# Patient Record
Sex: FEMALE | Race: WHITE | NOT HISPANIC OR LATINO | Employment: UNEMPLOYED | URBAN - METROPOLITAN AREA
[De-identification: names, ages, dates, MRNs, and addresses within clinical notes are randomized per-mention and may not be internally consistent; named-entity substitution may affect disease eponyms.]

---

## 2018-05-24 LAB
ALBUMIN SERPL-MCNC: 4.4 G/DL (ref 3.6–5.1)
ALBUMIN/GLOB SERPL: 1.8 (CALC) (ref 1–2.5)
ALP SERPL-CCNC: 81 U/L (ref 33–130)
ALT SERPL-CCNC: 25 U/L (ref 6–29)
AST SERPL-CCNC: 24 U/L (ref 10–35)
BASOPHILS # BLD AUTO: 73 CELLS/UL (ref 0–200)
BASOPHILS NFR BLD AUTO: 1.1 %
BILIRUB SERPL-MCNC: 0.5 MG/DL (ref 0.2–1.2)
BUN SERPL-MCNC: 12 MG/DL (ref 7–25)
BUN/CREAT SERPL: NORMAL (CALC) (ref 6–22)
CALCIUM SERPL-MCNC: 10 MG/DL (ref 8.6–10.4)
CHLORIDE SERPL-SCNC: 103 MMOL/L (ref 98–110)
CHOLEST SERPL-MCNC: 201 MG/DL
CHOLEST/HDLC SERPL: 3 (CALC)
CO2 SERPL-SCNC: 28 MMOL/L (ref 20–31)
CREAT SERPL-MCNC: 0.82 MG/DL (ref 0.5–1.05)
EOSINOPHIL # BLD AUTO: 191 CELLS/UL (ref 15–500)
EOSINOPHIL NFR BLD AUTO: 2.9 %
ERYTHROCYTE [DISTWIDTH] IN BLOOD BY AUTOMATED COUNT: 12.6 % (ref 11–15)
FT4I SERPL CALC-MCNC: 4.7 (ref 1.4–3.8)
GLOBULIN SER CALC-MCNC: 2.4 G/DL (CALC) (ref 1.9–3.7)
GLUCOSE SERPL-MCNC: 85 MG/DL (ref 65–99)
HCT VFR BLD AUTO: 43.4 % (ref 35–45)
HDLC SERPL-MCNC: 66 MG/DL
HGB BLD-MCNC: 14.4 G/DL (ref 11.7–15.5)
LDLC SERPL CALC-MCNC: 110 MG/DL (CALC)
LYMPHOCYTES # BLD AUTO: 2231 CELLS/UL (ref 850–3900)
LYMPHOCYTES NFR BLD AUTO: 33.8 %
MCH RBC QN AUTO: 28.6 PG (ref 27–33)
MCHC RBC AUTO-ENTMCNC: 33.2 G/DL (ref 32–36)
MCV RBC AUTO: 86.3 FL (ref 80–100)
MONOCYTES # BLD AUTO: 409 CELLS/UL (ref 200–950)
MONOCYTES NFR BLD AUTO: 6.2 %
NEUTROPHILS # BLD AUTO: 3696 CELLS/UL (ref 1500–7800)
NEUTROPHILS NFR BLD AUTO: 56 %
NONHDLC SERPL-MCNC: 135 MG/DL (CALC)
PLATELET # BLD AUTO: 362 THOUSAND/UL (ref 140–400)
PMV BLD REES-ECKER: 10.5 FL (ref 7.5–12.5)
POTASSIUM SERPL-SCNC: 4.2 MMOL/L (ref 3.5–5.3)
PROT SERPL-MCNC: 6.8 G/DL (ref 6.1–8.1)
RBC # BLD AUTO: 5.03 MILLION/UL (ref 3.8–5.1)
SL AMB EGFR AFRICAN AMERICAN: 96 ML/MIN/1.73M2
SL AMB EGFR NON AFRICAN AMERICAN: 83 ML/MIN/1.73M2
SODIUM SERPL-SCNC: 140 MMOL/L (ref 135–146)
T3RU NFR SERPL: 31 % (ref 22–35)
T4 SERPL-MCNC: 15.1 MCG/DL (ref 4.5–12)
TRIGL SERPL-MCNC: 135 MG/DL
TSH SERPL-ACNC: 0.67 MIU/L
WBC # BLD AUTO: 6.6 THOUSAND/UL (ref 3.8–10.8)

## 2018-05-29 ENCOUNTER — OFFICE VISIT (OUTPATIENT)
Dept: ENDOCRINOLOGY | Facility: HOSPITAL | Age: 53
End: 2018-05-29
Payer: COMMERCIAL

## 2018-05-29 VITALS
DIASTOLIC BLOOD PRESSURE: 76 MMHG | HEART RATE: 64 BPM | SYSTOLIC BLOOD PRESSURE: 118 MMHG | WEIGHT: 219.8 LBS | BODY MASS INDEX: 37.52 KG/M2 | HEIGHT: 64 IN

## 2018-05-29 DIAGNOSIS — I10 HYPERTENSION, UNSPECIFIED TYPE: ICD-10-CM

## 2018-05-29 DIAGNOSIS — E03.9 HYPOTHYROIDISM, UNSPECIFIED TYPE: ICD-10-CM

## 2018-05-29 DIAGNOSIS — E78.5 HYPERLIPIDEMIA, UNSPECIFIED HYPERLIPIDEMIA TYPE: Primary | ICD-10-CM

## 2018-05-29 PROCEDURE — 99204 OFFICE O/P NEW MOD 45 MIN: CPT | Performed by: INTERNAL MEDICINE

## 2018-05-29 RX ORDER — LISINOPRIL AND HYDROCHLOROTHIAZIDE 12.5; 1 MG/1; MG/1
2 TABLET ORAL DAILY
COMMUNITY
Start: 2018-04-09 | End: 2018-06-06

## 2018-05-29 RX ORDER — OMEPRAZOLE 40 MG/1
80 CAPSULE, DELAYED RELEASE ORAL DAILY
Refills: 3 | COMMUNITY
Start: 2018-04-27 | End: 2019-01-09

## 2018-05-29 RX ORDER — SIMVASTATIN 40 MG
40 TABLET ORAL
COMMUNITY
Start: 2018-04-09 | End: 2019-04-09 | Stop reason: SDUPTHER

## 2018-05-29 RX ORDER — LEVOTHYROXINE SODIUM 100 MCG
TABLET ORAL
COMMUNITY
Start: 2018-04-09 | End: 2018-07-11

## 2018-05-29 NOTE — LETTER
May 29, 2018     MD Joe Perez [address]  [address]  [address] [facility]    Patient: Cholo Agarwal   YOB: 1965   Date of Visit: 5/29/2018       Dear Dr Malcom Atkinson: Thank you for referring Cholo Agarwal to me for evaluation  Below are my notes for this consultation  If you have questions, please do not hesitate to call me  I look forward to following your patient along with you  Sincerely,        Lisa Barfield DO        CC: No Recipients  Lisa Barfield DO  5/29/2018 11:23 AM  Sign at close encounter  5/29/2018    Assessment/Plan      Diagnoses and all orders for this visit:    Hyperlipidemia, unspecified hyperlipidemia type  -     Lipid Panel with Direct LDL reflex Lab Collect; Future    Hypothyroidism, unspecified type  -     TSH, 3rd generation Lab Collect; Future  -     T4, free Lab Collect; Future  -     TSH, 3rd generation Lab Collect; Future  -     T4, free Lab Collect; Future    Hypertension, unspecified type  -     Comprehensive metabolic panel Lab Collect; Future    Other orders  -     SYNTHROID 100 MCG tablet; Take one tablet Mon-Sat, take half a tablet on Sun  -     lisinopril-hydrochlorothiazide (PRINZIDE,ZESTORETIC) 10-12 5 MG per tablet; Take 2 tablets by mouth daily  -     omeprazole (PriLOSEC) 40 MG capsule; Take 80 mg by mouth daily  -     simvastatin (ZOCOR) 40 mg tablet; Take 40 mg by mouth daily at bedtime  -     Multiple Vitamins-Minerals (CENTRUM SILVER 50+WOMEN PO); Take by mouth        Assessment/Plan:  1  Hypothyroidism:  Clinically and biochemically euthyroid on Synthroid brand 100 mcg tablets, 1 tablet 6 days of the week and 0 5 tablets on Sundays  Will continue this dose  I provided a lab slip for a TSH and free T4 to be done in August which will be about 2 months after the patient's planned gastric sleeve bariatric surgery as her dose of thyroid hormone may need to go down    Will plan to see her in 6 months from now with repeat labs done just before that visit as well  2   Hyperlipidemia:  Continue simvastatin 40 mg daily  Check lipids before next visit as this dose may be able to go down as well  3   Hypertension:  Controlled on current regimen  Will monitor blood pressure closely after bariatric surgery and adjust meds as needed  CC:   Hypothyroidism    History of Present Illness     HPI: Chevy Royal is a 47y o  year old female with history of hypothyroidism, hyperlipidemia, hypertension who presents to [facility] care  Previous patient doctor Sharpe  She takes Synthroid brand 100 mcg tablets, 1 tablet 6 days of the week and half tablet on Sundays  Clinically she feels well and denies any significant symptoms of hypothyroidism or hyperthyroidism  She does have a gastric sleeve surgery planned at [facility] next month  She takes lisinopril-hydrochlorothiazide 10-12 5 mg tablets 2 tablets daily for her blood pressure and she takes simvastatin 40 mg daily for cholesterol  Review of Systems   Constitutional: Negative for chills, fatigue and fever  HENT: Negative for trouble swallowing and voice change  Eyes: Negative for visual disturbance  Respiratory: Negative for shortness of breath  Cardiovascular: Negative for chest pain, palpitations and leg swelling  Gastrointestinal: Negative for abdominal pain, nausea and vomiting  Endocrine: Negative for polydipsia and polyuria  Musculoskeletal: Negative for arthralgias and myalgias  Skin: Negative for rash  Neurological: Negative for dizziness, tremors and weakness  Hematological: Negative for adenopathy  Psychiatric/Behavioral: Negative for agitation and confusion  Historical Information   History reviewed  No pertinent past medical history  History reviewed  No pertinent surgical history    Social History   History   Alcohol use Not on file     History   Drug use: Unknown     History   Smoking Status    Never Smoker   Smokeless Tobacco    Never Used Family History:   Family History   Problem Relation Age of Onset    Arthritis Mother     Heart disease Mother     Hypertension Mother     Hyperlipidemia Mother     Arthritis Father     Heart disease Father     Hypertension Father     Hyperlipidemia Father        Meds/Allergies   Current Outpatient Prescriptions   Medication Sig Dispense Refill    lisinopril-hydrochlorothiazide (PRINZIDE,ZESTORETIC) 10-12 5 MG per tablet Take 2 tablets by mouth daily      Multiple Vitamins-Minerals (CENTRUM SILVER 50+WOMEN PO) Take by mouth      omeprazole (PriLOSEC) 40 MG capsule Take 80 mg by mouth daily  3    simvastatin (ZOCOR) 40 mg tablet Take 40 mg by mouth daily at bedtime      SYNTHROID 100 MCG tablet Take one tablet Mon-Sat, take half a tablet on Sun       No current facility-administered medications for this visit  Allergies   Allergen Reactions    Tetracyclines & Related Rash       Objective   Vitals: Blood pressure 118/76, pulse 64, height 5' 4" (1 626 m), weight 99 7 kg (219 lb 12 8 oz)  Invasive Devices          No matching active lines, drains, or airways          Physical Exam   Constitutional: She is oriented to person, place, and time  She appears well-developed and well-nourished  No distress  HENT:   Head: Normocephalic and atraumatic  Mouth/Throat: No oropharyngeal exudate  Eyes: Conjunctivae and EOM are normal  Pupils are equal, round, and reactive to light  No scleral icterus  Neck: Normal range of motion  Neck supple  No thyromegaly present  Cardiovascular: Normal rate and regular rhythm  No murmur heard  Pulmonary/Chest: Effort normal and breath sounds normal  She has no wheezes  Abdominal: Soft  Bowel sounds are normal  She exhibits no distension  There is no tenderness  Musculoskeletal: Normal range of motion  She exhibits no edema  Neurological: She is alert and oriented to person, place, and time  She exhibits normal muscle tone  Skin: Skin is warm and dry  No rash noted  She is not diaphoretic  Psychiatric: She has a normal mood and affect  Her behavior is normal        The history was obtained from the review of the chart and from the patient      Lab Results:      Recent Results (from the past 33686 hour(s))   Lipid panel    Collection Time: 05/23/18 11:27 AM   Result Value Ref Range    Total Cholesterol 201 (H) <200 mg/dL    SL AMB HDL CHOLESTEROL 66 >50 mg/dL    Triglycerides 135 <150 mg/dL    SL AMB LDL-CHOLESTEROL 110 (H) mg/dL (calc)    SL AMB CHOL/HDLC RATIO 3 0 <5 0 (calc)    SL AMB NON HDL CHOLESTEROL 135 (H) <130 mg/dL (calc)   Comprehensive metabolic panel    Collection Time: 05/23/18 11:27 AM   Result Value Ref Range    SL AMB GLUCOSE 85 65 - 99 mg/dL    BUN 12 7 - 25 mg/dL    Creatinine, Serum 0 82 0 50 - 1 05 mg/dL    eGFR Non  83 > OR = 60 mL/min/1 73m2    SL AMB EGFR  96 > OR = 60 mL/min/1 73m2    SL AMB BUN/CREATININE RATIO NOT APPLICABLE 6 - 22 (calc)    SL AMB SODIUM 140 135 - 146 mmol/L    SL AMB POTASSIUM 4 2 3 5 - 5 3 mmol/L    SL AMB CHLORIDE 103 98 - 110 mmol/L    SL AMB CARBON DIOXIDE 28 20 - 31 mmol/L    SL AMB CALCIUM 10 0 8 6 - 10 4 mg/dL    SL AMB PROTEIN, TOTAL 6 8 6 1 - 8 1 g/dL    Serum Albumin 4 4 3 6 - 5 1 g/dL    SL AMB GLOBULIN 2 4 1 9 - 3 7 g/dL (calc)    SL AMB ALBUMIN/GLOBULIN RATIO 1 8 1 0 - 2 5 (calc)    SL AMB BILIRUBIN, TOTAL 0 5 0 2 - 1 2 mg/dL    SL AMB ALKALINE PHOSPHATASE 81 33 - 130 U/L    SL AMB AST 24 10 - 35 U/L    SL AMB ALT 25 6 - 29 U/L   CBC and differential    Collection Time: 05/23/18 11:27 AM   Result Value Ref Range    SL AMB LAB WHITE BLOOD CELL COUNT 6 6 3 8 - 10 8 Thousand/uL    SL AMB LAB RED BLOOD CELLS 5 03 3 80 - 5 10 Million/uL    Hemoglobin 14 4 11 7 - 15 5 g/dL    Hematocrit 43 4 35 0 - 45 0 %    MCV 86 3 80 0 - 100 0 fL    MCH 28 6 27 0 - 33 0 pg    MCHC 33 2 32 0 - 36 0 g/dL    RDW 12 6 11 0 - 15 0 %    Platelet Count 476 835 - 400 Thousand/uL    SL AMB MPV 10 5 7 5 - 12 5 fL    Neutrophils (Absolute) 3,696 1,500 - 7,800 cells/uL    Lymphocytes (Absolute) 2,231 850 - 3,900 cells/uL    Monocytes (Absolute) 409 200 - 950 cells/uL    Eosinophils (Absolute) 191 15 - 500 cells/uL    Basophils (Absolute) 73 0 - 200 cells/uL    Neutrophils 56 %    Lymphocytes 33 8 %    Monocytes 6 2 %    Eosinophils 2 9 %    Basophils 1 1 %   Thyroid Panel With TSH    Collection Time: 05/23/18 11:27 AM   Result Value Ref Range    T3 Uptake Ratio 31 22 - 35 %    T4, Total 15 1 (H) 4 5 - 12 0 mcg/dL    Free T4 Index (T7) 4 7 (H) 1 4 - 3 8    TSH 0 67 mIU/L         No future appointments

## 2018-05-29 NOTE — PROGRESS NOTES
5/29/2018    Assessment/Plan      Diagnoses and all orders for this visit:    Hyperlipidemia, unspecified hyperlipidemia type  -     Lipid Panel with Direct LDL reflex Lab Collect; Future    Hypothyroidism, unspecified type  -     TSH, 3rd generation Lab Collect; Future  -     T4, free Lab Collect; Future  -     TSH, 3rd generation Lab Collect; Future  -     T4, free Lab Collect; Future    Hypertension, unspecified type  -     Comprehensive metabolic panel Lab Collect; Future    Other orders  -     SYNTHROID 100 MCG tablet; Take one tablet Mon-Sat, take half a tablet on Sun  -     lisinopril-hydrochlorothiazide (PRINZIDE,ZESTORETIC) 10-12 5 MG per tablet; Take 2 tablets by mouth daily  -     omeprazole (PriLOSEC) 40 MG capsule; Take 80 mg by mouth daily  -     simvastatin (ZOCOR) 40 mg tablet; Take 40 mg by mouth daily at bedtime  -     Multiple Vitamins-Minerals (CENTRUM SILVER 50+WOMEN PO); Take by mouth        Assessment/Plan:  1  Hypothyroidism:  Clinically and biochemically euthyroid on Synthroid brand 100 mcg tablets, 1 tablet 6 days of the week and 0 5 tablets on Sundays  Will continue this dose  I provided a lab slip for a TSH and free T4 to be done in August which will be about 2 months after the patient's planned gastric sleeve bariatric surgery as her dose of thyroid hormone may need to go down  Will plan to see her in 6 months from now with repeat labs done just before that visit as well  2   Hyperlipidemia:  Continue simvastatin 40 mg daily  Check lipids before next visit as this dose may be able to go down as well  3   Hypertension:  Controlled on current regimen  Will monitor blood pressure closely after bariatric surgery and adjust meds as needed  CC:   Hypothyroidism    History of Present Illness     HPI: Yasemin Bray is a 47y o  year old female with history of hypothyroidism, hyperlipidemia, hypertension who presents to establish care  Previous patient doctor Sharpe    She takes Synthroid brand 100 mcg tablets, 1 tablet 6 days of the week and half tablet on Sundays  Clinically she feels well and denies any significant symptoms of hypothyroidism or hyperthyroidism  She does have a gastric sleeve surgery planned at [facility] next month  She takes lisinopril-hydrochlorothiazide 10-12 5 mg tablets 2 tablets daily for her blood pressure and she takes simvastatin 40 mg daily for cholesterol  Review of Systems   Constitutional: Negative for chills, fatigue and fever  HENT: Negative for trouble swallowing and voice change  Eyes: Negative for visual disturbance  Respiratory: Negative for shortness of breath  Cardiovascular: Negative for chest pain, palpitations and leg swelling  Gastrointestinal: Negative for abdominal pain, nausea and vomiting  Endocrine: Negative for polydipsia and polyuria  Musculoskeletal: Negative for arthralgias and myalgias  Skin: Negative for rash  Neurological: Negative for dizziness, tremors and weakness  Hematological: Negative for adenopathy  Psychiatric/Behavioral: Negative for agitation and confusion  Historical Information   History reviewed  No pertinent past medical history  History reviewed  No pertinent surgical history    Social History   History   Alcohol use Not on file     History   Drug use: Unknown     History   Smoking Status    Never Smoker   Smokeless Tobacco    Never Used     Family History:   Family History   Problem Relation Age of Onset    Arthritis Mother     Heart disease Mother     Hypertension Mother     Hyperlipidemia Mother     Arthritis Father     Heart disease Father     Hypertension Father     Hyperlipidemia Father        Meds/Allergies   Current Outpatient Prescriptions   Medication Sig Dispense Refill    lisinopril-hydrochlorothiazide (PRINZIDE,ZESTORETIC) 10-12 5 MG per tablet Take 2 tablets by mouth daily      Multiple Vitamins-Minerals (CENTRUM SILVER 50+WOMEN PO) Take by mouth      omeprazole (PriLOSEC) 40 MG capsule Take 80 mg by mouth daily  3    simvastatin (ZOCOR) 40 mg tablet Take 40 mg by mouth daily at bedtime      SYNTHROID 100 MCG tablet Take one tablet Mon-Sat, take half a tablet on Sun       No current facility-administered medications for this visit  Allergies   Allergen Reactions    Tetracyclines & Related Rash       Objective   Vitals: Blood pressure 118/76, pulse 64, height 5' 4" (1 626 m), weight 99 7 kg (219 lb 12 8 oz)  Invasive Devices          No matching active lines, drains, or airways          Physical Exam   Constitutional: She is oriented to person, place, and time  She appears well-developed and well-nourished  No distress  HENT:   Head: Normocephalic and atraumatic  Mouth/Throat: No oropharyngeal exudate  Eyes: Conjunctivae and EOM are normal  Pupils are equal, round, and reactive to light  No scleral icterus  Neck: Normal range of motion  Neck supple  No thyromegaly present  Cardiovascular: Normal rate and regular rhythm  No murmur heard  Pulmonary/Chest: Effort normal and breath sounds normal  She has no wheezes  Abdominal: Soft  Bowel sounds are normal  She exhibits no distension  There is no tenderness  Musculoskeletal: Normal range of motion  She exhibits no edema  Neurological: She is alert and oriented to person, place, and time  She exhibits normal muscle tone  Skin: Skin is warm and dry  No rash noted  She is not diaphoretic  Psychiatric: She has a normal mood and affect  Her behavior is normal        The history was obtained from the review of the chart and from the patient      Lab Results:      Recent Results (from the past 87068 hour(s))   Lipid panel    Collection Time: 05/23/18 11:27 AM   Result Value Ref Range    Total Cholesterol 201 (H) <200 mg/dL    SL AMB HDL CHOLESTEROL 66 >50 mg/dL    Triglycerides 135 <150 mg/dL    SL AMB LDL-CHOLESTEROL 110 (H) mg/dL (calc)    SL AMB CHOL/HDLC RATIO 3 0 <5 0 (calc)    SL AMB NON HDL CHOLESTEROL 135 (H) <130 mg/dL (calc)   Comprehensive metabolic panel    Collection Time: 05/23/18 11:27 AM   Result Value Ref Range    SL AMB GLUCOSE 85 65 - 99 mg/dL    BUN 12 7 - 25 mg/dL    Creatinine, Serum 0 82 0 50 - 1 05 mg/dL    eGFR Non  83 > OR = 60 mL/min/1 73m2    SL AMB EGFR  96 > OR = 60 mL/min/1 73m2    SL AMB BUN/CREATININE RATIO NOT APPLICABLE 6 - 22 (calc)    SL AMB SODIUM 140 135 - 146 mmol/L    SL AMB POTASSIUM 4 2 3 5 - 5 3 mmol/L    SL AMB CHLORIDE 103 98 - 110 mmol/L    SL AMB CARBON DIOXIDE 28 20 - 31 mmol/L    SL AMB CALCIUM 10 0 8 6 - 10 4 mg/dL    SL AMB PROTEIN, TOTAL 6 8 6 1 - 8 1 g/dL    Serum Albumin 4 4 3 6 - 5 1 g/dL    SL AMB GLOBULIN 2 4 1 9 - 3 7 g/dL (calc)    SL AMB ALBUMIN/GLOBULIN RATIO 1 8 1 0 - 2 5 (calc)    SL AMB BILIRUBIN, TOTAL 0 5 0 2 - 1 2 mg/dL    SL AMB ALKALINE PHOSPHATASE 81 33 - 130 U/L    SL AMB AST 24 10 - 35 U/L    SL AMB ALT 25 6 - 29 U/L   CBC and differential    Collection Time: 05/23/18 11:27 AM   Result Value Ref Range    SL AMB LAB WHITE BLOOD CELL COUNT 6 6 3 8 - 10 8 Thousand/uL    SL AMB LAB RED BLOOD CELLS 5 03 3 80 - 5 10 Million/uL    Hemoglobin 14 4 11 7 - 15 5 g/dL    Hematocrit 43 4 35 0 - 45 0 %    MCV 86 3 80 0 - 100 0 fL    MCH 28 6 27 0 - 33 0 pg    MCHC 33 2 32 0 - 36 0 g/dL    RDW 12 6 11 0 - 15 0 %    Platelet Count 990 524 - 400 Thousand/uL    SL AMB MPV 10 5 7 5 - 12 5 fL    Neutrophils (Absolute) 3,696 1,500 - 7,800 cells/uL    Lymphocytes (Absolute) 2,231 850 - 3,900 cells/uL    Monocytes (Absolute) 409 200 - 950 cells/uL    Eosinophils (Absolute) 191 15 - 500 cells/uL    Basophils (Absolute) 73 0 - 200 cells/uL    Neutrophils 56 %    Lymphocytes 33 8 %    Monocytes 6 2 %    Eosinophils 2 9 %    Basophils 1 1 %   Thyroid Panel With TSH    Collection Time: 05/23/18 11:27 AM   Result Value Ref Range    T3 Uptake Ratio 31 22 - 35 %    T4, Total 15 1 (H) 4 5 - 12 0 mcg/dL    Free T4 Index (T7) 4 7 (H) 1 4 - 3 8    TSH 0 67 mIU/L         No future appointments

## 2018-06-06 ENCOUNTER — TELEPHONE (OUTPATIENT)
Dept: ENDOCRINOLOGY | Facility: HOSPITAL | Age: 53
End: 2018-06-06

## 2018-06-06 DIAGNOSIS — I10 HYPERTENSION, UNSPECIFIED TYPE: Primary | ICD-10-CM

## 2018-06-06 RX ORDER — LISINOPRIL 10 MG/1
10 TABLET ORAL DAILY
Qty: 90 TABLET | Refills: 3 | Status: SHIPPED | OUTPATIENT
Start: 2018-06-06 | End: 2018-07-11 | Stop reason: SDUPTHER

## 2018-06-06 NOTE — TELEPHONE ENCOUNTER
Pt called and said that she is having Bariatric Surgery on 6/19  She is currently on Lisinopril/Hctz  Her Surgeon does not want her on the diuretic and wants you to switch her to just Lisinopril due to dehydrations issues  If ok she wants a 90 day sent to [facility]

## 2018-06-22 ENCOUNTER — TELEPHONE (OUTPATIENT)
Dept: ENDOCRINOLOGY | Facility: HOSPITAL | Age: 53
End: 2018-06-22

## 2018-06-22 DIAGNOSIS — E03.9 HYPOTHYROIDISM, UNSPECIFIED TYPE: Primary | ICD-10-CM

## 2018-06-22 DIAGNOSIS — E78.5 HYPERLIPIDEMIA, UNSPECIFIED HYPERLIPIDEMIA TYPE: ICD-10-CM

## 2018-06-22 NOTE — TELEPHONE ENCOUNTER
Pt had bariatric surgery on 6/19/18 and was told to make 3w f/u appt with you for bp and medication check  I scheduled her with you on 7/11/18  She is questioning if she should get any labs done prior to that appt  Labs ordered at last appt 5/29/18 were for august and November  Labs can be mailed   Thanks

## 2018-07-07 LAB
ALBUMIN SERPL-MCNC: 4.2 G/DL (ref 3.6–5.1)
ALBUMIN/GLOB SERPL: 1.7 (CALC) (ref 1–2.5)
ALP SERPL-CCNC: 79 U/L (ref 33–130)
ALT SERPL-CCNC: 14 U/L (ref 6–29)
AST SERPL-CCNC: 18 U/L (ref 10–35)
BILIRUB SERPL-MCNC: 0.5 MG/DL (ref 0.2–1.2)
BUN SERPL-MCNC: 11 MG/DL (ref 7–25)
BUN/CREAT SERPL: NORMAL (CALC) (ref 6–22)
CALCIUM SERPL-MCNC: 10.1 MG/DL (ref 8.6–10.4)
CHLORIDE SERPL-SCNC: 103 MMOL/L (ref 98–110)
CHOLEST SERPL-MCNC: 166 MG/DL
CHOLEST/HDLC SERPL: 3.3 (CALC)
CO2 SERPL-SCNC: 26 MMOL/L (ref 20–31)
CREAT SERPL-MCNC: 0.78 MG/DL (ref 0.5–1.05)
GLOBULIN SER CALC-MCNC: 2.5 G/DL (CALC) (ref 1.9–3.7)
GLUCOSE SERPL-MCNC: 80 MG/DL (ref 65–99)
HDLC SERPL-MCNC: 50 MG/DL
LDLC SERPL CALC-MCNC: 99 MG/DL (CALC)
NONHDLC SERPL-MCNC: 116 MG/DL (CALC)
POTASSIUM SERPL-SCNC: 4.5 MMOL/L (ref 3.5–5.3)
PROT SERPL-MCNC: 6.7 G/DL (ref 6.1–8.1)
SL AMB EGFR AFRICAN AMERICAN: 101 ML/MIN/1.73M2
SL AMB EGFR NON AFRICAN AMERICAN: 87 ML/MIN/1.73M2
SODIUM SERPL-SCNC: 140 MMOL/L (ref 135–146)
T4 FREE SERPL-MCNC: 1.6 NG/DL (ref 0.8–1.8)
TRIGL SERPL-MCNC: 84 MG/DL
TSH SERPL-ACNC: 0.21 MIU/L

## 2018-07-11 ENCOUNTER — OFFICE VISIT (OUTPATIENT)
Dept: ENDOCRINOLOGY | Facility: HOSPITAL | Age: 53
End: 2018-07-11
Payer: COMMERCIAL

## 2018-07-11 VITALS
BODY MASS INDEX: 34.35 KG/M2 | HEIGHT: 64 IN | DIASTOLIC BLOOD PRESSURE: 72 MMHG | HEART RATE: 68 BPM | WEIGHT: 201.2 LBS | SYSTOLIC BLOOD PRESSURE: 112 MMHG

## 2018-07-11 DIAGNOSIS — E78.5 HYPERLIPIDEMIA, UNSPECIFIED HYPERLIPIDEMIA TYPE: ICD-10-CM

## 2018-07-11 DIAGNOSIS — E03.9 HYPOTHYROIDISM, UNSPECIFIED TYPE: Primary | ICD-10-CM

## 2018-07-11 DIAGNOSIS — I10 HYPERTENSION, UNSPECIFIED TYPE: ICD-10-CM

## 2018-07-11 PROCEDURE — 99214 OFFICE O/P EST MOD 30 MIN: CPT | Performed by: INTERNAL MEDICINE

## 2018-07-11 RX ORDER — LANOLIN ALCOHOL/MO/W.PET/CERES
3000 CREAM (GRAM) TOPICAL DAILY
COMMUNITY
End: 2019-01-09

## 2018-07-11 RX ORDER — URSODIOL 300 MG/1
300 CAPSULE ORAL 2 TIMES DAILY
COMMUNITY
Start: 2018-06-04 | End: 2019-01-09

## 2018-07-11 RX ORDER — LISINOPRIL 10 MG/1
TABLET ORAL
Qty: 90 TABLET | Refills: 3
Start: 2018-07-11 | End: 2019-04-09

## 2018-07-11 RX ORDER — LEVOTHYROXINE SODIUM 0.07 MG/1
TABLET ORAL
Qty: 90 TABLET | Refills: 3 | Status: SHIPPED | OUTPATIENT
Start: 2018-07-11 | End: 2019-04-09 | Stop reason: SDUPTHER

## 2018-07-11 NOTE — LETTER
July 11, 2018     MD Joe Fern[address]dez [address]  [address]  [facility]    Patient: Haim Gomez   YOB: 1965   Date of Visit: 7/11/2018       Dear Dr Dorothy Andrews: Thank you for referring Haim Gomez to me for evaluation  Below are my notes for this consultation  If you have questions, please do not hesitate to call me  I look forward to following your patient along with you  Sincerely,        Elaine Banks DO        CC: No Recipients  Elaine Banks DO  7/11/2018  9:36 AM  Sign at close encounter  7/11/2018    Assessment/Plan      Diagnoses and all orders for this visit:    Hypothyroidism, unspecified type  -     levothyroxine 75 mcg tablet; 1 tab daily  SYNTHROID BRAND NECESSARY  -     TSH, 3rd generation Lab Collect; Future  -     T4, free Lab Collect; Future  -     Comprehensive metabolic panel Lab Collect; Future    Hypertension, unspecified type  -     lisinopril (ZESTRIL) 10 mg tablet; 0 5 tab daily  Hyperlipidemia, unspecified hyperlipidemia type  -     Lipid Panel with Direct LDL reflex Lab Collect; Future    Other orders  -     ursodiol (ACTIGALL) 300 mg capsule; Take 300 mg by mouth 2 (two) times a day  -     B Complex-C (SUPER B COMPLEX PO); Take by mouth  -     cyanocobalamin (VITAMIN B-12) 1,000 mcg tablet; Take 3,000 mcg by mouth daily  -     Calcium Carb-Cholecalciferol (CALCIUM 600/VITAMIN D3 PO); Take by mouth  -     Docusate Sodium (COLACE PO); Take by mouth        Assessment/Plan:  1  Hypothyroidism:  The patient has lost about 20 lb since her gastric sleeve surgery in mid June  Her labs show her dose is too high in the setting of weight loss  Will decrease her Synthroid to 75 mcg daily of the brand specific  Plan to repeat TSH free T4 with her lab work that is scheduled in September  We will see her shortly after that      2   Hypertension:  Her blood pressure is well controlled today and I think we can cut her lisinopril in half to 5 mg daily  We will monitor her blood pressure at her follow-up appointment in September and see if we can cut it in half to 2 5 mg daily or discontinue the lisinopril and monitor off of medication  3   Hyperlipidemia:  Continue simvastatin 40 mg daily and will check lipid panel at next appointment in see if we can cut down this dose as well  CC:   Hypothyroidism, hypertension, hyperlipidemia    History of Present Illness     HPI: Sherrill Simons is a 47y o  year old female with history of hypothyroidism, hypertension, hyperlipidemia who presents for a follow-up appointment  She is postop status post gastric sleeve surgery in [place]  This was done in June and she has lost about 20 lb so far and is feeling great  She denies symptoms of hyperthyroidism such as palpitations, tachycardia, heat intolerance  She continues on Synthroid brand 100 mcg daily with only a half pill on Sundays  She also takes lisinopril 10 mg daily for her blood pressure and simvastatin 40 mg daily  We reviewed her blood work which is listed below  Review of Systems   Constitutional: Negative for chills, fatigue and fever  HENT: Negative for trouble swallowing and voice change  Eyes: Negative for visual disturbance  Respiratory: Negative for shortness of breath  Cardiovascular: Negative for chest pain, palpitations and leg swelling  Gastrointestinal: Negative for abdominal pain, nausea and vomiting  Endocrine: Negative for polydipsia and polyuria  Musculoskeletal: Negative for arthralgias and myalgias  Skin: Negative for rash  Neurological: Negative for dizziness, tremors and weakness  Hematological: Negative for adenopathy  Psychiatric/Behavioral: Negative for agitation and confusion  Historical Information   History reviewed  No pertinent past medical history  History reviewed  No pertinent surgical history    Social History   History   Alcohol use Not on file     History   Drug use: Unknown History   Smoking Status    Never Smoker   Smokeless Tobacco    Never Used     Family History:   Family History   Problem Relation Age of Onset    Arthritis Mother     Heart disease Mother     Hypertension Mother     Hyperlipidemia Mother     Arthritis Father     Heart disease Father     Hypertension Father     Hyperlipidemia Father        Meds/Allergies   Current Outpatient Prescriptions   Medication Sig Dispense Refill    B Complex-C (SUPER B COMPLEX PO) Take by mouth      Calcium Carb-Cholecalciferol (CALCIUM 600/VITAMIN D3 PO) Take by mouth      cyanocobalamin (VITAMIN B-12) 1,000 mcg tablet Take 3,000 mcg by mouth daily      Docusate Sodium (COLACE PO) Take by mouth      lisinopril (ZESTRIL) 10 mg tablet 0 5 tab daily  90 tablet 3    Multiple Vitamins-Minerals (CENTRUM SILVER 50+WOMEN PO) Take by mouth      omeprazole (PriLOSEC) 40 MG capsule Take 80 mg by mouth daily  3    simvastatin (ZOCOR) 40 mg tablet Take 40 mg by mouth daily at bedtime      ursodiol (ACTIGALL) 300 mg capsule Take 300 mg by mouth 2 (two) times a day      levothyroxine 75 mcg tablet 1 tab daily  SYNTHROID BRAND NECESSARY  90 tablet 3     No current facility-administered medications for this visit  Allergies   Allergen Reactions    Tetracyclines & Related Rash       Objective   Vitals: Blood pressure 112/72, pulse 68, height 5' 4" (1 626 m), weight 91 3 kg (201 lb 3 2 oz)  Invasive Devices          No matching active lines, drains, or airways          Physical Exam   Constitutional: She is oriented to person, place, and time  She appears well-developed and well-nourished  No distress  HENT:   Head: Normocephalic and atraumatic  Mouth/Throat: No oropharyngeal exudate  Eyes: Conjunctivae and EOM are normal  Pupils are equal, round, and reactive to light  No scleral icterus  Neck: Normal range of motion  Neck supple  No thyromegaly present  Cardiovascular: Normal rate and regular rhythm      No murmur heard   Pulmonary/Chest: Effort normal and breath sounds normal  She has no wheezes  Abdominal: Soft  Bowel sounds are normal  She exhibits no distension  There is no tenderness  Musculoskeletal: Normal range of motion  She exhibits no edema  Neurological: She is alert and oriented to person, place, and time  She exhibits normal muscle tone  Skin: Skin is warm and dry  No rash noted  She is not diaphoretic  Psychiatric: She has a normal mood and affect  Her behavior is normal        The history was obtained from the review of the chart and from the patient      Lab Results:      Recent Results (from the past 44798 hour(s))   Lipid panel    Collection Time: 05/23/18 11:27 AM   Result Value Ref Range    Total Cholesterol 201 (H) <200 mg/dL    HDL 66 >50 mg/dL    Triglycerides 135 <150 mg/dL    LDL Direct 110 (H) mg/dL (calc)    Chol HDLC Ratio 3 0 <5 0 (calc)    Non-HDL Cholesterol 135 (H) <130 mg/dL (calc)   Comprehensive metabolic panel    Collection Time: 05/23/18 11:27 AM   Result Value Ref Range    SL AMB GLUCOSE 85 65 - 99 mg/dL    BUN 12 7 - 25 mg/dL    Creatinine, Serum 0 82 0 50 - 1 05 mg/dL    eGFR Non  83 > OR = 60 mL/min/1 73m2    SL AMB EGFR  96 > OR = 60 mL/min/1 73m2    SL AMB BUN/CREATININE RATIO NOT APPLICABLE 6 - 22 (calc)    SL AMB SODIUM 140 135 - 146 mmol/L    SL AMB POTASSIUM 4 2 3 5 - 5 3 mmol/L    SL AMB CHLORIDE 103 98 - 110 mmol/L    SL AMB CARBON DIOXIDE 28 20 - 31 mmol/L    SL AMB CALCIUM 10 0 8 6 - 10 4 mg/dL    SL AMB PROTEIN, TOTAL 6 8 6 1 - 8 1 g/dL    Serum Albumin 4 4 3 6 - 5 1 g/dL    SL AMB GLOBULIN 2 4 1 9 - 3 7 g/dL (calc)    SL AMB ALBUMIN/GLOBULIN RATIO 1 8 1 0 - 2 5 (calc)    SL AMB BILIRUBIN, TOTAL 0 5 0 2 - 1 2 mg/dL    SL AMB ALKALINE PHOSPHATASE 81 33 - 130 U/L    SL AMB AST 24 10 - 35 U/L    SL AMB ALT 25 6 - 29 U/L   CBC and differential    Collection Time: 05/23/18 11:27 AM   Result Value Ref Range    SL AMB LAB WHITE BLOOD CELL COUNT 6 6 3 8 - 10 8 Thousand/uL    SL AMB LAB RED BLOOD CELLS 5 03 3 80 - 5 10 Million/uL    Hemoglobin 14 4 11 7 - 15 5 g/dL    Hematocrit 43 4 35 0 - 45 0 %    MCV 86 3 80 0 - 100 0 fL    MCH 28 6 27 0 - 33 0 pg    MCHC 33 2 32 0 - 36 0 g/dL    RDW 12 6 11 0 - 15 0 %    Platelet Count 999 350 - 400 Thousand/uL    SL AMB MPV 10 5 7 5 - 12 5 fL    Neutrophils (Absolute) 3,696 1,500 - 7,800 cells/uL    Lymphocytes (Absolute) 2,231 850 - 3,900 cells/uL    Monocytes (Absolute) 409 200 - 950 cells/uL    Eosinophils (Absolute) 191 15 - 500 cells/uL    Basophils (Absolute) 73 0 - 200 cells/uL    Neutrophils 56 %    Lymphocytes 33 8 %    Monocytes 6 2 %    Eosinophils 2 9 %    Basophils 1 1 %   Thyroid Panel With TSH    Collection Time: 05/23/18 11:27 AM   Result Value Ref Range    T3 Uptake Ratio 31 22 - 35 %    T4, Total 15 1 (H) 4 5 - 12 0 mcg/dL    Free T4 Index (T7) 4 7 (H) 1 4 - 3 8    TSH 0 67 mIU/L   Lipid Panel with Direct LDL reflex    Collection Time: 07/06/18  9:56 AM   Result Value Ref Range    Total Cholesterol 166 <200 mg/dL    HDL 50 (L) >50 mg/dL    Triglycerides 84 <150 mg/dL    LDL Direct 99 mg/dL (calc)    Chol HDLC Ratio 3 3 <5 0 (calc)    Non-HDL Cholesterol 116 <130 mg/dL (calc)   Comprehensive metabolic panel    Collection Time: 07/06/18  9:56 AM   Result Value Ref Range    SL AMB GLUCOSE 80 65 - 99 mg/dL    BUN 11 7 - 25 mg/dL    Creatinine, Serum 0 78 0 50 - 1 05 mg/dL    eGFR Non  87 > OR = 60 mL/min/1 73m2    SL AMB EGFR  101 > OR = 60 mL/min/1 73m2    SL AMB BUN/CREATININE RATIO NOT APPLICABLE 6 - 22 (calc)    SL AMB SODIUM 140 135 - 146 mmol/L    SL AMB POTASSIUM 4 5 3 5 - 5 3 mmol/L    SL AMB CHLORIDE 103 98 - 110 mmol/L    SL AMB CARBON DIOXIDE 26 20 - 31 mmol/L    SL AMB CALCIUM 10 1 8 6 - 10 4 mg/dL    SL AMB PROTEIN, TOTAL 6 7 6 1 - 8 1 g/dL    Serum Albumin 4 2 3 6 - 5 1 g/dL    SL AMB GLOBULIN 2 5 1 9 - 3 7 g/dL (calc)    SL AMB ALBUMIN/GLOBULIN RATIO 1 7 1 0 - 2 5 (calc)    SL AMB BILIRUBIN, TOTAL 0 5 0 2 - 1 2 mg/dL    SL AMB ALKALINE PHOSPHATASE 79 33 - 130 U/L    SL AMB AST 18 10 - 35 U/L    SL AMB ALT 14 6 - 29 U/L   T4, free    Collection Time: 07/06/18  9:56 AM   Result Value Ref Range    Free t4 1 6 0 8 - 1 8 ng/dL   TSH, 3rd generation    Collection Time: 07/06/18  9:56 AM   Result Value Ref Range    TSH 0 21 (L) mIU/L         No future appointments

## 2018-07-11 NOTE — PROGRESS NOTES
7/11/2018    Assessment/Plan      Diagnoses and all orders for this visit:    Hypothyroidism, unspecified type  -     levothyroxine 75 mcg tablet; 1 tab daily  SYNTHROID BRAND NECESSARY  -     TSH, 3rd generation Lab Collect; Future  -     T4, free Lab Collect; Future  -     Comprehensive metabolic panel Lab Collect; Future    Hypertension, unspecified type  -     lisinopril (ZESTRIL) 10 mg tablet; 0 5 tab daily  Hyperlipidemia, unspecified hyperlipidemia type  -     Lipid Panel with Direct LDL reflex Lab Collect; Future    Other orders  -     ursodiol (ACTIGALL) 300 mg capsule; Take 300 mg by mouth 2 (two) times a day  -     B Complex-C (SUPER B COMPLEX PO); Take by mouth  -     cyanocobalamin (VITAMIN B-12) 1,000 mcg tablet; Take 3,000 mcg by mouth daily  -     Calcium Carb-Cholecalciferol (CALCIUM 600/VITAMIN D3 PO); Take by mouth  -     Docusate Sodium (COLACE PO); Take by mouth        Assessment/Plan:  1  Hypothyroidism:  The patient has lost about 20 lb since her gastric sleeve surgery in mid June  Her labs show her dose is too high in the setting of weight loss  Will decrease her Synthroid to 75 mcg daily of the brand specific  Plan to repeat TSH free T4 with her lab work that is scheduled in September  We will see her shortly after that  2   Hypertension:  Her blood pressure is well controlled today and I think we can cut her lisinopril in half to 5 mg daily  We will monitor her blood pressure at her follow-up appointment in September and see if we can cut it in half to 2 5 mg daily or discontinue the lisinopril and monitor off of medication  3   Hyperlipidemia:  Continue simvastatin 40 mg daily and will check lipid panel at next appointment in see if we can cut down this dose as well        CC:   Hypothyroidism, hypertension, hyperlipidemia    History of Present Illness     HPI: Sophy Bajwa is a 47y o  year old female with history of hypothyroidism, hypertension, hyperlipidemia who presents for a follow-up appointment  She is postop status post gastric sleeve surgery in [place]  This was done in June and she has lost about 20 lb so far and is feeling great  She denies symptoms of hyperthyroidism such as palpitations, tachycardia, heat intolerance  She continues on Synthroid brand 100 mcg daily with only a half pill on Sundays  She also takes lisinopril 10 mg daily for her blood pressure and simvastatin 40 mg daily  We reviewed her blood work which is listed below  Review of Systems   Constitutional: Negative for chills, fatigue and fever  HENT: Negative for trouble swallowing and voice change  Eyes: Negative for visual disturbance  Respiratory: Negative for shortness of breath  Cardiovascular: Negative for chest pain, palpitations and leg swelling  Gastrointestinal: Negative for abdominal pain, nausea and vomiting  Endocrine: Negative for polydipsia and polyuria  Musculoskeletal: Negative for arthralgias and myalgias  Skin: Negative for rash  Neurological: Negative for dizziness, tremors and weakness  Hematological: Negative for adenopathy  Psychiatric/Behavioral: Negative for agitation and confusion  Historical Information   History reviewed  No pertinent past medical history  History reviewed  No pertinent surgical history    Social History   History   Alcohol use Not on file     History   Drug use: Unknown     History   Smoking Status    Never Smoker   Smokeless Tobacco    Never Used     Family History:   Family History   Problem Relation Age of Onset    Arthritis Mother     Heart disease Mother     Hypertension Mother     Hyperlipidemia Mother     Arthritis Father     Heart disease Father     Hypertension Father     Hyperlipidemia Father        Meds/Allergies   Current Outpatient Prescriptions   Medication Sig Dispense Refill    B Complex-C (SUPER B COMPLEX PO) Take by mouth      Calcium Carb-Cholecalciferol (CALCIUM 600/VITAMIN D3 PO) Take by mouth      cyanocobalamin (VITAMIN B-12) 1,000 mcg tablet Take 3,000 mcg by mouth daily      Docusate Sodium (COLACE PO) Take by mouth      lisinopril (ZESTRIL) 10 mg tablet 0 5 tab daily  90 tablet 3    Multiple Vitamins-Minerals (CENTRUM SILVER 50+WOMEN PO) Take by mouth      omeprazole (PriLOSEC) 40 MG capsule Take 80 mg by mouth daily  3    simvastatin (ZOCOR) 40 mg tablet Take 40 mg by mouth daily at bedtime      ursodiol (ACTIGALL) 300 mg capsule Take 300 mg by mouth 2 (two) times a day      levothyroxine 75 mcg tablet 1 tab daily  SYNTHROID BRAND NECESSARY  90 tablet 3     No current facility-administered medications for this visit  Allergies   Allergen Reactions    Tetracyclines & Related Rash       Objective   Vitals: Blood pressure 112/72, pulse 68, height 5' 4" (1 626 m), weight 91 3 kg (201 lb 3 2 oz)  Invasive Devices          No matching active lines, drains, or airways          Physical Exam   Constitutional: She is oriented to person, place, and time  She appears well-developed and well-nourished  No distress  HENT:   Head: Normocephalic and atraumatic  Mouth/Throat: No oropharyngeal exudate  Eyes: Conjunctivae and EOM are normal  Pupils are equal, round, and reactive to light  No scleral icterus  Neck: Normal range of motion  Neck supple  No thyromegaly present  Cardiovascular: Normal rate and regular rhythm  No murmur heard  Pulmonary/Chest: Effort normal and breath sounds normal  She has no wheezes  Abdominal: Soft  Bowel sounds are normal  She exhibits no distension  There is no tenderness  Musculoskeletal: Normal range of motion  She exhibits no edema  Neurological: She is alert and oriented to person, place, and time  She exhibits normal muscle tone  Skin: Skin is warm and dry  No rash noted  She is not diaphoretic  Psychiatric: She has a normal mood and affect   Her behavior is normal        The history was obtained from the review of the chart and from the patient      Lab Results:      Recent Results (from the past 98185 hour(s))   Lipid panel    Collection Time: 05/23/18 11:27 AM   Result Value Ref Range    Total Cholesterol 201 (H) <200 mg/dL    HDL 66 >50 mg/dL    Triglycerides 135 <150 mg/dL    LDL Direct 110 (H) mg/dL (calc)    Chol HDLC Ratio 3 0 <5 0 (calc)    Non-HDL Cholesterol 135 (H) <130 mg/dL (calc)   Comprehensive metabolic panel    Collection Time: 05/23/18 11:27 AM   Result Value Ref Range    SL AMB GLUCOSE 85 65 - 99 mg/dL    BUN 12 7 - 25 mg/dL    Creatinine, Serum 0 82 0 50 - 1 05 mg/dL    eGFR Non  83 > OR = 60 mL/min/1 73m2    SL AMB EGFR  96 > OR = 60 mL/min/1 73m2    SL AMB BUN/CREATININE RATIO NOT APPLICABLE 6 - 22 (calc)    SL AMB SODIUM 140 135 - 146 mmol/L    SL AMB POTASSIUM 4 2 3 5 - 5 3 mmol/L    SL AMB CHLORIDE 103 98 - 110 mmol/L    SL AMB CARBON DIOXIDE 28 20 - 31 mmol/L    SL AMB CALCIUM 10 0 8 6 - 10 4 mg/dL    SL AMB PROTEIN, TOTAL 6 8 6 1 - 8 1 g/dL    Serum Albumin 4 4 3 6 - 5 1 g/dL    SL AMB GLOBULIN 2 4 1 9 - 3 7 g/dL (calc)    SL AMB ALBUMIN/GLOBULIN RATIO 1 8 1 0 - 2 5 (calc)    SL AMB BILIRUBIN, TOTAL 0 5 0 2 - 1 2 mg/dL    SL AMB ALKALINE PHOSPHATASE 81 33 - 130 U/L    SL AMB AST 24 10 - 35 U/L    SL AMB ALT 25 6 - 29 U/L   CBC and differential    Collection Time: 05/23/18 11:27 AM   Result Value Ref Range    SL AMB LAB WHITE BLOOD CELL COUNT 6 6 3 8 - 10 8 Thousand/uL    SL AMB LAB RED BLOOD CELLS 5 03 3 80 - 5 10 Million/uL    Hemoglobin 14 4 11 7 - 15 5 g/dL    Hematocrit 43 4 35 0 - 45 0 %    MCV 86 3 80 0 - 100 0 fL    MCH 28 6 27 0 - 33 0 pg    MCHC 33 2 32 0 - 36 0 g/dL    RDW 12 6 11 0 - 15 0 %    Platelet Count 800 293 - 400 Thousand/uL    SL AMB MPV 10 5 7 5 - 12 5 fL    Neutrophils (Absolute) 3,696 1,500 - 7,800 cells/uL    Lymphocytes (Absolute) 2,231 850 - 3,900 cells/uL    Monocytes (Absolute) 409 200 - 950 cells/uL    Eosinophils (Absolute) 191 15 - 500 cells/uL Basophils (Absolute) 73 0 - 200 cells/uL    Neutrophils 56 %    Lymphocytes 33 8 %    Monocytes 6 2 %    Eosinophils 2 9 %    Basophils 1 1 %   Thyroid Panel With TSH    Collection Time: 05/23/18 11:27 AM   Result Value Ref Range    T3 Uptake Ratio 31 22 - 35 %    T4, Total 15 1 (H) 4 5 - 12 0 mcg/dL    Free T4 Index (T7) 4 7 (H) 1 4 - 3 8    TSH 0 67 mIU/L   Lipid Panel with Direct LDL reflex    Collection Time: 07/06/18  9:56 AM   Result Value Ref Range    Total Cholesterol 166 <200 mg/dL    HDL 50 (L) >50 mg/dL    Triglycerides 84 <150 mg/dL    LDL Direct 99 mg/dL (calc)    Chol HDLC Ratio 3 3 <5 0 (calc)    Non-HDL Cholesterol 116 <130 mg/dL (calc)   Comprehensive metabolic panel    Collection Time: 07/06/18  9:56 AM   Result Value Ref Range    SL AMB GLUCOSE 80 65 - 99 mg/dL    BUN 11 7 - 25 mg/dL    Creatinine, Serum 0 78 0 50 - 1 05 mg/dL    eGFR Non  87 > OR = 60 mL/min/1 73m2    SL AMB EGFR  101 > OR = 60 mL/min/1 73m2    SL AMB BUN/CREATININE RATIO NOT APPLICABLE 6 - 22 (calc)    SL AMB SODIUM 140 135 - 146 mmol/L    SL AMB POTASSIUM 4 5 3 5 - 5 3 mmol/L    SL AMB CHLORIDE 103 98 - 110 mmol/L    SL AMB CARBON DIOXIDE 26 20 - 31 mmol/L    SL AMB CALCIUM 10 1 8 6 - 10 4 mg/dL    SL AMB PROTEIN, TOTAL 6 7 6 1 - 8 1 g/dL    Serum Albumin 4 2 3 6 - 5 1 g/dL    SL AMB GLOBULIN 2 5 1 9 - 3 7 g/dL (calc)    SL AMB ALBUMIN/GLOBULIN RATIO 1 7 1 0 - 2 5 (calc)    SL AMB BILIRUBIN, TOTAL 0 5 0 2 - 1 2 mg/dL    SL AMB ALKALINE PHOSPHATASE 79 33 - 130 U/L    SL AMB AST 18 10 - 35 U/L    SL AMB ALT 14 6 - 29 U/L   T4, free    Collection Time: 07/06/18  9:56 AM   Result Value Ref Range    Free t4 1 6 0 8 - 1 8 ng/dL   TSH, 3rd generation    Collection Time: 07/06/18  9:56 AM   Result Value Ref Range    TSH 0 21 (L) mIU/L         No future appointments

## 2018-09-11 LAB
ALBUMIN SERPL-MCNC: 4.6 G/DL (ref 3.6–5.1)
ALBUMIN/GLOB SERPL: 1.8 (CALC) (ref 1–2.5)
ALP SERPL-CCNC: 89 U/L (ref 33–130)
ALT SERPL-CCNC: 10 U/L (ref 6–29)
AST SERPL-CCNC: 14 U/L (ref 10–35)
BILIRUB SERPL-MCNC: 0.6 MG/DL (ref 0.2–1.2)
BUN SERPL-MCNC: 15 MG/DL (ref 7–25)
BUN/CREAT SERPL: NORMAL (CALC) (ref 6–22)
CALCIUM SERPL-MCNC: 10 MG/DL (ref 8.6–10.4)
CHLORIDE SERPL-SCNC: 102 MMOL/L (ref 98–110)
CHOLEST SERPL-MCNC: 178 MG/DL
CHOLEST/HDLC SERPL: 2.5 (CALC)
CO2 SERPL-SCNC: 28 MMOL/L (ref 20–32)
CREAT SERPL-MCNC: 0.73 MG/DL (ref 0.5–1.05)
GLOBULIN SER CALC-MCNC: 2.5 G/DL (CALC) (ref 1.9–3.7)
GLUCOSE SERPL-MCNC: 85 MG/DL (ref 65–99)
HDLC SERPL-MCNC: 71 MG/DL
LDLC SERPL CALC-MCNC: 88 MG/DL (CALC)
NONHDLC SERPL-MCNC: 107 MG/DL (CALC)
POTASSIUM SERPL-SCNC: 4.2 MMOL/L (ref 3.5–5.3)
PROT SERPL-MCNC: 7.1 G/DL (ref 6.1–8.1)
SL AMB EGFR AFRICAN AMERICAN: 110 ML/MIN/1.73M2
SL AMB EGFR NON AFRICAN AMERICAN: 95 ML/MIN/1.73M2
SODIUM SERPL-SCNC: 140 MMOL/L (ref 135–146)
T4 FREE SERPL-MCNC: 1.5 NG/DL (ref 0.8–1.8)
TRIGL SERPL-MCNC: 92 MG/DL
TSH SERPL-ACNC: 0.83 MIU/L

## 2018-09-20 ENCOUNTER — OFFICE VISIT (OUTPATIENT)
Dept: ENDOCRINOLOGY | Facility: HOSPITAL | Age: 53
End: 2018-09-20
Payer: COMMERCIAL

## 2018-09-20 VITALS
HEIGHT: 64 IN | BODY MASS INDEX: 31.14 KG/M2 | SYSTOLIC BLOOD PRESSURE: 134 MMHG | DIASTOLIC BLOOD PRESSURE: 90 MMHG | WEIGHT: 182.4 LBS | HEART RATE: 66 BPM

## 2018-09-20 DIAGNOSIS — I10 HYPERTENSION, UNSPECIFIED TYPE: ICD-10-CM

## 2018-09-20 DIAGNOSIS — E03.9 HYPOTHYROIDISM, UNSPECIFIED TYPE: Primary | ICD-10-CM

## 2018-09-20 DIAGNOSIS — E78.5 HYPERLIPIDEMIA, UNSPECIFIED HYPERLIPIDEMIA TYPE: ICD-10-CM

## 2018-09-20 PROCEDURE — 99214 OFFICE O/P EST MOD 30 MIN: CPT | Performed by: NURSE PRACTITIONER

## 2018-09-20 NOTE — PATIENT INSTRUCTIONS
For now, continue Synthroid at 75 mcg dose Monday through Saturday and take 1/2 tablet on Sunday  Continue lisinopril and simvastatin  Will recheck TSH and Free T4 in 6 weeks to re-evaluate

## 2018-09-20 NOTE — PROGRESS NOTES
Prema Mazariegos 47 y o  female MRN: [id number]    Encounter: [id number]      Assessment/Plan     Assessment: This is a 47y o -year-old female with hypothyroidism, hypertension and hyperlipidemia  Plan:  1  Hypothyroidism:  The patient has lost about 40 lb since her gastric sleeve surgery in mid June  She does complain of some heat intolerance and difficulty sleeping  Her most recent TSH is low-normal with a high normal free T4  I have asked her to take her Synthroid 75 mcg daily Monday through Saturday and decrease her dose to 1/2 tablet on Sunday  Recheck TSH and free T4 in 6 weeks to reassess  Further titration of her Synthroid dose will take place after review of updated lab work, if necessary      2  Hypertension:  Her blood pressure was initially elevated upon arrival in the office but reassessed to 122/76  For now, continue lisinopril at current dose      3  Hyperlipidemia:  Fasting lipid profile is stable  Continue simvastatin at current dose  CC:   Hypothyroidism follow-up    History of Present Illness     HPI:  47y o  year old female with history of hypothyroidism, hypertension, hyperlipidemia who presents for a follow-up appointment  She is postop status post gastric sleeve surgery in [place]  This was done in June and she has lost about 40 lb so far and is feeling great  She is currently taking Synthroid 75 mcg daily  Her most recent TSH from September 11, 2018 is 0 83 with a free T4 of 1 5  She complains of some insomnia worsening over the past few months and heat intolerance  For her hypertension, she is treated with lisinopril 5 mg daily  Her hyperlipidemia is treated with simvastatin 40 mg daily  Review of Systems   Constitutional: Negative  Negative for chills, fatigue and fever  HENT: Negative  Negative for trouble swallowing and voice change  Eyes: Negative  Respiratory: Negative  Negative for chest tightness and shortness of breath      Cardiovascular: Negative  Negative for chest pain  Gastrointestinal: Negative  Negative for abdominal pain, constipation, diarrhea and vomiting  Endocrine: Positive for heat intolerance (at times)  Negative for cold intolerance, polydipsia, polyphagia and polyuria  Genitourinary: Negative  Musculoskeletal: Negative  Skin: Negative  Allergic/Immunologic: Negative  Neurological: Negative  Negative for dizziness, syncope, light-headedness and headaches  Hematological: Negative  Psychiatric/Behavioral: Positive for sleep disturbance (insomnia over the past few months)  All other systems reviewed and are negative  Historical Information   No past medical history on file  No past surgical history on file  Social History   History   Alcohol use Not on file     History   Drug use: Unknown     History   Smoking Status    Never Smoker   Smokeless Tobacco    Never Used     Family History:   Family History   Problem Relation Age of Onset    Arthritis Mother     Heart disease Mother     Hypertension Mother     Hyperlipidemia Mother     Arthritis Father     Heart disease Father     Hypertension Father     Hyperlipidemia Father        Meds/Allergies   Current Outpatient Prescriptions   Medication Sig Dispense Refill    B Complex-C (SUPER B COMPLEX PO) Take by mouth      Calcium Carb-Cholecalciferol (CALCIUM 600/VITAMIN D3 PO) Take by mouth      levothyroxine 75 mcg tablet 1 tab daily  SYNTHROID BRAND NECESSARY  90 tablet 3    lisinopril (ZESTRIL) 10 mg tablet 0 5 tab daily   90 tablet 3    Multiple Vitamins-Minerals (CENTRUM SILVER 50+WOMEN PO) Take by mouth      simvastatin (ZOCOR) 40 mg tablet Take 40 mg by mouth daily at bedtime      ursodiol (ACTIGALL) 300 mg capsule Take 300 mg by mouth 2 (two) times a day      cyanocobalamin (VITAMIN B-12) 1,000 mcg tablet Take 3,000 mcg by mouth daily      Docusate Sodium (COLACE PO) Take by mouth      omeprazole (PriLOSEC) 40 MG capsule Take 80 mg by mouth daily  3     No current facility-administered medications for this visit  Allergies   Allergen Reactions    Tetracyclines & Related Rash       Objective   Vitals: Blood pressure 134/90, pulse 66, height 5' 4" (1 626 m), weight 82 7 kg (182 lb 6 4 oz)  Physical Exam   Constitutional: She is oriented to person, place, and time  She appears well-developed and well-nourished  No distress  HENT:   Head: Normocephalic and atraumatic  Mouth/Throat: Oropharynx is clear and moist    Eyes: Conjunctivae and EOM are normal  Pupils are equal, round, and reactive to light  Right eye exhibits no discharge  Left eye exhibits no discharge  No scleral icterus  Neck: Normal range of motion  Neck supple  No JVD present  No tracheal deviation present  No thyromegaly present  Cardiovascular: Normal rate, regular rhythm, normal heart sounds and intact distal pulses  Exam reveals no gallop and no friction rub  No murmur heard  Pulmonary/Chest: Effort normal and breath sounds normal  No stridor  No respiratory distress  She has no wheezes  She has no rales  She exhibits no tenderness  Abdominal: Soft  Bowel sounds are normal  She exhibits no distension  There is no tenderness  Musculoskeletal: Normal range of motion  She exhibits no edema, tenderness or deformity  Lymphadenopathy:     She has no cervical adenopathy  Neurological: She is alert and oriented to person, place, and time  She displays normal reflexes  No cranial nerve deficit  Coordination normal    Skin: Skin is warm and dry  No rash noted  No erythema  No pallor  Dry skin   Psychiatric: She has a normal mood and affect  Her behavior is normal  Judgment and thought content normal    Vitals reviewed  Lab Results:   Lab Results   Component Value Date/Time    Free t4 1 5 09/10/2018 09:43 AM    Free t4 1 6 07/06/2018 09:56 AM     Portions of the record may have been created with voice recognition software   Occasional wrong word or "sound a like" substitutions may have occurred due to the inherent limitations of voice recognition software  Read the chart carefully and recognize, using context, where substitutions have occurred

## 2018-10-31 LAB
T4 FREE SERPL-MCNC: 1.5 NG/DL (ref 0.8–1.8)
TSH SERPL-ACNC: 0.81 MIU/L

## 2018-11-01 NOTE — PROGRESS NOTES
Please call the patient regarding abnormal result  Thyroid function remains relatively unchanged since decreasing her dose on Sunday to 1/2 tablet  Please omit half tablet on Sunday and continue current dose Monday through Saturday  Recheck TSH and free T4 in 6 weeks to reassess

## 2018-11-02 DIAGNOSIS — E03.9 HYPOTHYROIDISM, UNSPECIFIED TYPE: Primary | ICD-10-CM

## 2018-12-19 LAB
T4 FREE SERPL-MCNC: 1.4 NG/DL (ref 0.8–1.8)
TSH SERPL-ACNC: 1.48 MIU/L

## 2019-01-09 ENCOUNTER — OFFICE VISIT (OUTPATIENT)
Dept: ENDOCRINOLOGY | Facility: HOSPITAL | Age: 54
End: 2019-01-09
Payer: COMMERCIAL

## 2019-01-09 VITALS
HEART RATE: 68 BPM | WEIGHT: 169.8 LBS | DIASTOLIC BLOOD PRESSURE: 78 MMHG | BODY MASS INDEX: 28.99 KG/M2 | HEIGHT: 64 IN | SYSTOLIC BLOOD PRESSURE: 130 MMHG

## 2019-01-09 DIAGNOSIS — E78.5 HYPERLIPIDEMIA, UNSPECIFIED HYPERLIPIDEMIA TYPE: ICD-10-CM

## 2019-01-09 DIAGNOSIS — I10 HYPERTENSION, UNSPECIFIED TYPE: ICD-10-CM

## 2019-01-09 DIAGNOSIS — E03.9 HYPOTHYROIDISM, UNSPECIFIED TYPE: Primary | ICD-10-CM

## 2019-01-09 PROCEDURE — 99214 OFFICE O/P EST MOD 30 MIN: CPT | Performed by: NURSE PRACTITIONER

## 2019-01-09 NOTE — PROGRESS NOTES
Mirella Salazar 47 y o  female MRN: [id number]    Encounter: [id number]      Assessment/Plan     Assessment: This is a 47y o -year-old female with hypothyroidism, hypertension and hyperlipidemia  Plan:  1   Hypothyroidism: Jenna Lange most recent TSH and free T4 are normal  Continue Synthroid at current dose  The patient has lost about 65 lb since her gastric sleeve surgery in mid June  Check TSH and free T4 prior to next visit        2   Hypertension:   She is normotensive in the office today  Continue lisinopril at current dose  Check comprehensive metabolic panel prior to next visit  3   Hyperlipidemia: Continue simvastatin at current dose  Check fasting lipid panel prior to next office visit  CC:   Hypothyroidism follow-up    History of Present Illness     HPI:  47 y  o  year old female with history of hypothyroidism, hypertension, hyperlipidemia who presents for a follow-up appointment  Kassandra Us is postop status post gastric sleeve surgery in [place]   This was done in June and she has lost about 65 lb so far and is feeling great  Kassandra Us is currently taking Synthroid 75 mcg daily Monday through Saturday with no tablet on Sunday  Her most recent TSH from December 18, 2018 is 1 48 with a free T4 of 1 4  She complains of some lingering insomnia and heat intolerance which has improved since lowering her dose      For her hypertension, she is treated with lisinopril 5 mg daily      Her hyperlipidemia is treated with simvastatin 40 mg daily        Review of Systems   Constitutional: Negative  Negative for chills, fatigue and fever  HENT: Negative  Negative for trouble swallowing and voice change  Eyes: Negative  Respiratory: Negative  Negative for chest tightness and shortness of breath  Cardiovascular: Negative  Negative for chest pain  Gastrointestinal: Negative  Negative for abdominal pain, constipation, diarrhea and vomiting  Endocrine: Positive for heat intolerance (At times)   Negative for cold intolerance, polydipsia, polyphagia and polyuria  Genitourinary: Negative  Musculoskeletal: Negative  Skin: Negative  Allergic/Immunologic: Negative  Neurological: Negative  Negative for dizziness, syncope, light-headedness and headaches  Hematological: Negative  Psychiatric/Behavioral: Positive for sleep disturbance ( insomnia at times)  All other systems reviewed and are negative  Historical Information   No past medical history on file  No past surgical history on file  Social History   History   Alcohol use Not on file     History   Drug use: Unknown     History   Smoking Status    Never Smoker   Smokeless Tobacco    Never Used     Family History:   Family History   Problem Relation Age of Onset    Arthritis Mother     Heart disease Mother     Hypertension Mother     Hyperlipidemia Mother     Arthritis Father     Heart disease Father     Hypertension Father     Hyperlipidemia Father        Meds/Allergies   Current Outpatient Prescriptions   Medication Sig Dispense Refill    Calcium Carb-Cholecalciferol (CALCIUM 600/VITAMIN D3 PO) Take by mouth      Docusate Sodium (COLACE PO) Take by mouth      levothyroxine 75 mcg tablet 1 tab daily  SYNTHROID BRAND NECESSARY  (Patient taking differently: 1 tab daily Monday through Saturday, nothing on Sunday  SYNTHROID BRAND NECESSARY  ) 90 tablet 3    lisinopril (ZESTRIL) 10 mg tablet 0 5 tab daily  90 tablet 3    Multiple Vitamins-Minerals (CENTRUM SILVER 50+WOMEN PO) Take by mouth      simvastatin (ZOCOR) 40 mg tablet Take 40 mg by mouth daily at bedtime       No current facility-administered medications for this visit  Allergies   Allergen Reactions    Tetracyclines & Related Rash       Objective   Vitals: Blood pressure 130/78, pulse 68, height 5' 4" (1 626 m), weight 77 kg (169 lb 12 8 oz)  Physical Exam   Constitutional: She is oriented to person, place, and time   She appears well-developed and well-nourished  No distress  HENT:   Head: Normocephalic and atraumatic  Mouth/Throat: Oropharynx is clear and moist    Eyes: Pupils are equal, round, and reactive to light  Conjunctivae and EOM are normal  Right eye exhibits no discharge  Left eye exhibits no discharge  No scleral icterus  Neck: Normal range of motion  Neck supple  No JVD present  No tracheal deviation present  No thyromegaly present  Cardiovascular: Normal rate, regular rhythm, normal heart sounds and intact distal pulses  Exam reveals no gallop and no friction rub  No murmur heard  Pulmonary/Chest: Effort normal and breath sounds normal  No stridor  No respiratory distress  She has no wheezes  She has no rales  She exhibits no tenderness  Abdominal: Soft  Bowel sounds are normal  She exhibits no distension  There is no tenderness  Musculoskeletal: Normal range of motion  She exhibits no edema, tenderness or deformity  Lymphadenopathy:     She has no cervical adenopathy  Neurological: She is alert and oriented to person, place, and time  She displays normal reflexes  No cranial nerve deficit  Coordination normal    Skin: Skin is warm and dry  No rash noted  No erythema  No pallor  Psychiatric: She has a normal mood and affect  Her behavior is normal  Judgment and thought content normal    Vitals reviewed  Lab Results:   Lab Results   Component Value Date/Time    Free t4 1 4 12/18/2018 12:51 PM    Free t4 1 5 10/30/2018 11:59 AM    Free t4 1 5 09/10/2018 09:43 AM     Portions of the record may have been created with voice recognition software  Occasional wrong word or "sound a like" substitutions may have occurred due to the inherent limitations of voice recognition software  Read the chart carefully and recognize, using context, where substitutions have occurred

## 2019-01-09 NOTE — PATIENT INSTRUCTIONS
For now, continue Synthroid at 75 mcg dose Monday through Saturday and no tablet on Sunday      Continue lisinopril and simvastatin      Obtain lab work as prescribed

## 2019-03-20 LAB
ALBUMIN SERPL-MCNC: 4.5 G/DL (ref 3.6–5.1)
ALBUMIN/GLOB SERPL: 2.1 (CALC) (ref 1–2.5)
ALP SERPL-CCNC: 70 U/L (ref 33–130)
ALT SERPL-CCNC: 33 U/L (ref 6–29)
AST SERPL-CCNC: 26 U/L (ref 10–35)
BILIRUB SERPL-MCNC: 0.6 MG/DL (ref 0.2–1.2)
BUN SERPL-MCNC: 16 MG/DL (ref 7–25)
BUN/CREAT SERPL: ABNORMAL (CALC) (ref 6–22)
CALCIUM SERPL-MCNC: 10 MG/DL (ref 8.6–10.4)
CHLORIDE SERPL-SCNC: 103 MMOL/L (ref 98–110)
CHOLEST SERPL-MCNC: 180 MG/DL
CHOLEST/HDLC SERPL: 2.5 (CALC)
CO2 SERPL-SCNC: 30 MMOL/L (ref 20–32)
CREAT SERPL-MCNC: 0.73 MG/DL (ref 0.5–1.05)
GLOBULIN SER CALC-MCNC: 2.1 G/DL (CALC) (ref 1.9–3.7)
GLUCOSE SERPL-MCNC: 82 MG/DL (ref 65–99)
HDLC SERPL-MCNC: 72 MG/DL
LDLC SERPL CALC-MCNC: 93 MG/DL (CALC)
NONHDLC SERPL-MCNC: 108 MG/DL (CALC)
POTASSIUM SERPL-SCNC: 4.4 MMOL/L (ref 3.5–5.3)
PROT SERPL-MCNC: 6.6 G/DL (ref 6.1–8.1)
SL AMB EGFR AFRICAN AMERICAN: 110 ML/MIN/1.73M2
SL AMB EGFR NON AFRICAN AMERICAN: 95 ML/MIN/1.73M2
SODIUM SERPL-SCNC: 141 MMOL/L (ref 135–146)
T4 FREE SERPL-MCNC: 1.5 NG/DL (ref 0.8–1.8)
TRIGL SERPL-MCNC: 63 MG/DL
TSH SERPL-ACNC: 0.99 MIU/L

## 2019-04-09 ENCOUNTER — OFFICE VISIT (OUTPATIENT)
Dept: ENDOCRINOLOGY | Facility: HOSPITAL | Age: 54
End: 2019-04-09
Payer: COMMERCIAL

## 2019-04-09 VITALS
HEIGHT: 64 IN | HEART RATE: 68 BPM | DIASTOLIC BLOOD PRESSURE: 76 MMHG | SYSTOLIC BLOOD PRESSURE: 112 MMHG | WEIGHT: 160.2 LBS | BODY MASS INDEX: 27.35 KG/M2

## 2019-04-09 DIAGNOSIS — E03.9 HYPOTHYROIDISM, UNSPECIFIED TYPE: Primary | ICD-10-CM

## 2019-04-09 DIAGNOSIS — E78.5 HYPERLIPIDEMIA, UNSPECIFIED HYPERLIPIDEMIA TYPE: ICD-10-CM

## 2019-04-09 DIAGNOSIS — I10 HYPERTENSION, UNSPECIFIED TYPE: ICD-10-CM

## 2019-04-09 PROCEDURE — 99214 OFFICE O/P EST MOD 30 MIN: CPT | Performed by: INTERNAL MEDICINE

## 2019-04-09 RX ORDER — SIMVASTATIN 40 MG
40 TABLET ORAL
Qty: 90 TABLET | Refills: 3 | Status: SHIPPED | OUTPATIENT
Start: 2019-04-09 | End: 2020-03-16 | Stop reason: SDUPTHER

## 2019-04-09 RX ORDER — LEVOTHYROXINE SODIUM 0.07 MG/1
TABLET ORAL
Qty: 90 TABLET | Refills: 3 | Status: SHIPPED | OUTPATIENT
Start: 2019-04-09 | End: 2020-05-27

## 2019-08-10 LAB
ALBUMIN SERPL-MCNC: 4 G/DL (ref 3.6–5.1)
ALBUMIN/GLOB SERPL: 1.7 (CALC) (ref 1–2.5)
ALP SERPL-CCNC: 72 U/L (ref 33–130)
ALT SERPL-CCNC: 23 U/L (ref 6–29)
AST SERPL-CCNC: 23 U/L (ref 10–35)
BILIRUB SERPL-MCNC: 0.5 MG/DL (ref 0.2–1.2)
BUN SERPL-MCNC: 13 MG/DL (ref 7–25)
BUN/CREAT SERPL: NORMAL (CALC) (ref 6–22)
CALCIUM SERPL-MCNC: 9.6 MG/DL (ref 8.6–10.4)
CHLORIDE SERPL-SCNC: 106 MMOL/L (ref 98–110)
CHOLEST SERPL-MCNC: 163 MG/DL
CHOLEST/HDLC SERPL: 2.3 (CALC)
CO2 SERPL-SCNC: 31 MMOL/L (ref 20–32)
CREAT SERPL-MCNC: 0.86 MG/DL (ref 0.5–1.05)
GLOBULIN SER CALC-MCNC: 2.4 G/DL (CALC) (ref 1.9–3.7)
GLUCOSE SERPL-MCNC: 81 MG/DL (ref 65–99)
HDLC SERPL-MCNC: 70 MG/DL
LDLC SERPL CALC-MCNC: 78 MG/DL (CALC)
NONHDLC SERPL-MCNC: 93 MG/DL (CALC)
POTASSIUM SERPL-SCNC: 4.1 MMOL/L (ref 3.5–5.3)
PROT SERPL-MCNC: 6.4 G/DL (ref 6.1–8.1)
SL AMB EGFR AFRICAN AMERICAN: 89 ML/MIN/1.73M2
SL AMB EGFR NON AFRICAN AMERICAN: 77 ML/MIN/1.73M2
SODIUM SERPL-SCNC: 143 MMOL/L (ref 135–146)
T4 FREE SERPL-MCNC: 1.5 NG/DL (ref 0.8–1.8)
TRIGL SERPL-MCNC: 73 MG/DL
TSH SERPL-ACNC: 1.16 MIU/L

## 2019-09-17 ENCOUNTER — OFFICE VISIT (OUTPATIENT)
Dept: ENDOCRINOLOGY | Facility: HOSPITAL | Age: 54
End: 2019-09-17
Payer: COMMERCIAL

## 2019-09-17 VITALS
WEIGHT: 153.2 LBS | SYSTOLIC BLOOD PRESSURE: 120 MMHG | HEIGHT: 64 IN | DIASTOLIC BLOOD PRESSURE: 84 MMHG | BODY MASS INDEX: 26.15 KG/M2 | HEART RATE: 67 BPM

## 2019-09-17 DIAGNOSIS — I10 HYPERTENSION, UNSPECIFIED TYPE: ICD-10-CM

## 2019-09-17 DIAGNOSIS — E03.9 HYPOTHYROIDISM, UNSPECIFIED TYPE: Primary | ICD-10-CM

## 2019-09-17 DIAGNOSIS — E78.5 HYPERLIPIDEMIA, UNSPECIFIED HYPERLIPIDEMIA TYPE: ICD-10-CM

## 2019-09-17 PROCEDURE — 3074F SYST BP LT 130 MM HG: CPT | Performed by: NURSE PRACTITIONER

## 2019-09-17 PROCEDURE — 99214 OFFICE O/P EST MOD 30 MIN: CPT | Performed by: NURSE PRACTITIONER

## 2019-09-17 PROCEDURE — 3079F DIAST BP 80-89 MM HG: CPT | Performed by: NURSE PRACTITIONER

## 2019-09-17 NOTE — PATIENT INSTRUCTIONS
For now, continue Synthroid at 75 mcg dose Monday through Saturday and no tablet on Sunday      Continue simvastatin, as discussed      Obtain lab work as prescribed

## 2019-09-17 NOTE — PROGRESS NOTES
Prema Mazariegos 49 y o  female MRN: [id number]    Encounter: [id number]      Assessment/Plan     Assessment: This is a 49y o -year-old female with hypothyroidism, hypertension and hyperlipidemia  Plan:  1   Hypothyroidism: Elveria Lolling most recent TSH and free T4 are normal  Continue Synthroid at current dose  The patient has lost about 80 lb since her gastric sleeve surgery  Check TSH and free T4 prior to next visit        2   Hypertension:   She is normotensive in the office today  Check comprehensive metabolic panel prior to next visit      3   Hyperlipidemia:  Stable  Continue simvastatin at current dose  Check fasting lipid panel prior to next office visit  CC:  Hypothyroidism follow-up    History of Present Illness     HPI:  49 y  o  year old female with history of hypothyroidism, hypertension, hyperlipidemia who presents for a follow-up appointment  Angel Bassett is postop status post gastric sleeve surgery in [place]   This was done in June and she has lost about 80 lb so far and is feeling great  Angel Bassett is currently taking Synthroid 75 mcg daily Monday through Saturday with no tablet on Sunday  Telma Hickman most recent TSH from  August 9, 2019 is  1 16 with a free T4 of 1 5   She complains of some lingering insomnia and heat intolerance which has improved since lowering her dose      For her hypertension, she was treated with lisinopril 5 mg daily prior to her last visit but has discontinued it completely over the past few months      Her hyperlipidemia is treated with simvastatin 40 mg daily  Her most recent fasting lipid panel from August 19, 2019 she has an LDL of 78, triglycerides of 73, HDL of 70 and total cholesterol of 163  Review of Systems   Constitutional: Negative  Negative for chills and fever  HENT: Negative  Eyes: Negative  Respiratory: Negative  Negative for chest tightness and shortness of breath  Cardiovascular: Negative  Negative for chest pain  Gastrointestinal: Negative    Negative for abdominal pain, constipation, diarrhea and vomiting  Genitourinary: Negative  Musculoskeletal: Negative  Skin: Negative  Allergic/Immunologic: Negative  Neurological: Negative  Negative for dizziness, syncope, light-headedness and headaches  Hematological: Negative  Psychiatric/Behavioral: Negative  All other systems reviewed and are negative  Historical Information   No past medical history on file  No past surgical history on file  Social History   Social History     Substance and Sexual Activity   Alcohol Use Not on file     Social History     Substance and Sexual Activity   Drug Use Not on file     Social History     Tobacco Use   Smoking Status Never Smoker   Smokeless Tobacco Never Used     Family History:   Family History   Problem Relation Age of Onset    Arthritis Mother     Heart disease Mother     Hypertension Mother     Hyperlipidemia Mother     Arthritis Father     Heart disease Father     Hypertension Father     Hyperlipidemia Father        Meds/Allergies   Current Outpatient Medications   Medication Sig Dispense Refill    Docusate Sodium (COLACE PO) Take by mouth      levothyroxine 75 mcg tablet 1 tab daily Monday through Saturday, nothing on Sunday  SYNTHROID BRAND NECESSARY  90 tablet 3    Multiple Vitamins-Minerals (CENTRUM SILVER 50+WOMEN PO) Take by mouth      simvastatin (ZOCOR) 40 mg tablet Take 1 tablet (40 mg total) by mouth daily at bedtime 90 tablet 3    Calcium Carb-Cholecalciferol (CALCIUM 600/VITAMIN D3 PO) Take by mouth       No current facility-administered medications for this visit  Allergies   Allergen Reactions    Tetracyclines & Related Rash       Objective   Vitals: Blood pressure 120/84, pulse 67, height 5' 4" (1 626 m), weight 69 5 kg (153 lb 3 2 oz)  Physical Exam   Constitutional: She is oriented to person, place, and time  She appears well-developed and well-nourished  HENT:   Head: Normocephalic and atraumatic  Mouth/Throat: Oropharynx is clear and moist    Eyes: Pupils are equal, round, and reactive to light  Conjunctivae and EOM are normal    Neck: Normal range of motion  Neck supple  Cardiovascular: Normal rate, regular rhythm, normal heart sounds and intact distal pulses  Pulmonary/Chest: Effort normal and breath sounds normal    Abdominal: Soft  Bowel sounds are normal    Musculoskeletal: Normal range of motion  Neurological: She is alert and oriented to person, place, and time  Skin: Skin is warm and dry  Psychiatric: She has a normal mood and affect  Her behavior is normal  Judgment and thought content normal    Vitals reviewed  Lab Results:   Lab Results   Component Value Date/Time    Free t4 1 5 08/09/2019 08:59 AM    Free t4 1 5 03/19/2019 09:40 AM    Free t4 1 4 12/18/2018 12:51 PM     Portions of the record may have been created with voice recognition software  Occasional wrong word or "sound a like" substitutions may have occurred due to the inherent limitations of voice recognition software  Read the chart carefully and recognize, using context, where substitutions have occurred

## 2020-03-12 LAB
ALBUMIN SERPL-MCNC: 4.3 G/DL (ref 3.6–5.1)
ALBUMIN/GLOB SERPL: 2.4 (CALC) (ref 1–2.5)
ALP SERPL-CCNC: 68 U/L (ref 37–153)
ALT SERPL-CCNC: 49 U/L (ref 6–29)
AST SERPL-CCNC: 33 U/L (ref 10–35)
BILIRUB SERPL-MCNC: 0.6 MG/DL (ref 0.2–1.2)
BUN SERPL-MCNC: 13 MG/DL (ref 7–25)
BUN/CREAT SERPL: ABNORMAL (CALC) (ref 6–22)
CALCIUM SERPL-MCNC: 9.6 MG/DL (ref 8.6–10.4)
CHLORIDE SERPL-SCNC: 106 MMOL/L (ref 98–110)
CHOLEST SERPL-MCNC: 173 MG/DL
CHOLEST/HDLC SERPL: 2.1 (CALC)
CO2 SERPL-SCNC: 29 MMOL/L (ref 20–32)
CREAT SERPL-MCNC: 0.77 MG/DL (ref 0.5–1.05)
GLOBULIN SER CALC-MCNC: 1.8 G/DL (CALC) (ref 1.9–3.7)
GLUCOSE SERPL-MCNC: 82 MG/DL (ref 65–99)
HDLC SERPL-MCNC: 82 MG/DL
LDLC SERPL CALC-MCNC: 77 MG/DL (CALC)
NONHDLC SERPL-MCNC: 91 MG/DL (CALC)
POTASSIUM SERPL-SCNC: 4.2 MMOL/L (ref 3.5–5.3)
PROT SERPL-MCNC: 6.1 G/DL (ref 6.1–8.1)
SL AMB EGFR AFRICAN AMERICAN: 102 ML/MIN/1.73M2
SL AMB EGFR NON AFRICAN AMERICAN: 88 ML/MIN/1.73M2
SODIUM SERPL-SCNC: 142 MMOL/L (ref 135–146)
T4 FREE SERPL-MCNC: 1.5 NG/DL (ref 0.8–1.8)
TRIGL SERPL-MCNC: 67 MG/DL
TSH SERPL-ACNC: 0.87 MIU/L

## 2020-03-16 ENCOUNTER — TELEPHONE (OUTPATIENT)
Dept: ENDOCRINOLOGY | Facility: HOSPITAL | Age: 55
End: 2020-03-16

## 2020-03-16 DIAGNOSIS — E78.5 HYPERLIPIDEMIA, UNSPECIFIED HYPERLIPIDEMIA TYPE: ICD-10-CM

## 2020-03-16 DIAGNOSIS — E03.9 HYPOTHYROIDISM, UNSPECIFIED TYPE: Primary | ICD-10-CM

## 2020-03-16 RX ORDER — SIMVASTATIN 40 MG
40 TABLET ORAL
Qty: 90 TABLET | Refills: 3 | Status: SHIPPED | OUTPATIENT
Start: 2020-03-16 | End: 2021-01-13 | Stop reason: SDUPTHER

## 2020-05-27 DIAGNOSIS — E03.9 HYPOTHYROIDISM, UNSPECIFIED TYPE: ICD-10-CM

## 2020-05-27 RX ORDER — LEVOTHYROXINE SODIUM 75 MCG
TABLET ORAL
Qty: 90 TABLET | Refills: 3 | Status: SHIPPED | OUTPATIENT
Start: 2020-05-27 | End: 2020-11-24 | Stop reason: SDUPTHER

## 2020-06-24 LAB
ALBUMIN SERPL-MCNC: 4.5 G/DL (ref 3.6–5.1)
ALBUMIN/GLOB SERPL: 2.1 (CALC) (ref 1–2.5)
ALP SERPL-CCNC: 78 U/L (ref 37–153)
ALT SERPL-CCNC: 30 U/L (ref 6–29)
AST SERPL-CCNC: 25 U/L (ref 10–35)
BILIRUB SERPL-MCNC: 0.7 MG/DL (ref 0.2–1.2)
BUN SERPL-MCNC: 15 MG/DL (ref 7–25)
BUN/CREAT SERPL: ABNORMAL (CALC) (ref 6–22)
CALCIUM SERPL-MCNC: 9.8 MG/DL (ref 8.6–10.4)
CHLORIDE SERPL-SCNC: 106 MMOL/L (ref 98–110)
CO2 SERPL-SCNC: 30 MMOL/L (ref 20–32)
CREAT SERPL-MCNC: 0.77 MG/DL (ref 0.5–1.05)
GLOBULIN SER CALC-MCNC: 2.1 G/DL (CALC) (ref 1.9–3.7)
GLUCOSE SERPL-MCNC: 87 MG/DL (ref 65–99)
POTASSIUM SERPL-SCNC: 4.3 MMOL/L (ref 3.5–5.3)
PROT SERPL-MCNC: 6.6 G/DL (ref 6.1–8.1)
SL AMB EGFR AFRICAN AMERICAN: 101 ML/MIN/1.73M2
SL AMB EGFR NON AFRICAN AMERICAN: 88 ML/MIN/1.73M2
SODIUM SERPL-SCNC: 141 MMOL/L (ref 135–146)
T4 FREE SERPL-MCNC: 1.8 NG/DL (ref 0.8–1.8)
TSH SERPL-ACNC: 1.01 MIU/L

## 2020-07-09 ENCOUNTER — OFFICE VISIT (OUTPATIENT)
Dept: ENDOCRINOLOGY | Facility: HOSPITAL | Age: 55
End: 2020-07-09
Payer: COMMERCIAL

## 2020-07-09 VITALS
HEART RATE: 69 BPM | HEIGHT: 64 IN | WEIGHT: 153.8 LBS | BODY MASS INDEX: 26.26 KG/M2 | DIASTOLIC BLOOD PRESSURE: 84 MMHG | TEMPERATURE: 97.8 F | SYSTOLIC BLOOD PRESSURE: 150 MMHG

## 2020-07-09 DIAGNOSIS — E78.49 OTHER HYPERLIPIDEMIA: ICD-10-CM

## 2020-07-09 DIAGNOSIS — E03.8 OTHER SPECIFIED HYPOTHYROIDISM: Primary | ICD-10-CM

## 2020-07-09 PROCEDURE — 99214 OFFICE O/P EST MOD 30 MIN: CPT | Performed by: NURSE PRACTITIONER

## 2020-07-09 NOTE — PROGRESS NOTES
Denia Vega 48 y o  female MRN: [id number]    Encounter: [id number]      Assessment/Plan     Assessment: This is a 48y o -year-old female with hypothyroidism, hypertension and hyperlipidemia  Plan:  1   Hypothyroidism: Chriss Eastern most recent TSH and free T4 are normal  Continue Synthroid at current dose    The patient has lost about 75 lb since her gastric sleeve surgery  Check TSH and free T4 prior to next visit        2   Hypertension: Check comprehensive metabolic panel prior to next visit      3   Hyperlipidemia: Continue simvastatin at current dose   Check fasting lipid panel prior to next office visit  CC:  Hypothyroidism follow-up    History of Present Illness     HPI:  48 y  o  year old female with history of hypothyroidism, hypertension, hyperlipidemia who presents for a follow-up appointment  Maytelakeisha Choudhary is postop status post gastric sleeve surgery in [facility]   She is currently taking Synthroid 75 mcg daily Monday through Saturday with no tablet on Sunday  [facility] most recent TSH from June 23, 2020  1 01 with a free T4 of 1 8   She complains of some lingering insomnia and heat intolerance which has improved since lowering her dose      For her hypertension, she was treated with lisinopril 5 mg daily in the past but this was discontinued over the past few months      Her hyperlipidemia is treated with simvastatin 40 mg daily  Review of Systems   Constitutional: Negative  Negative for chills, fatigue and fever  HENT: Negative  Negative for trouble swallowing and voice change  Eyes: Negative  Negative for visual disturbance  Respiratory: Negative  Negative for chest tightness and shortness of breath  Cardiovascular: Negative  Negative for chest pain  Gastrointestinal: Negative  Negative for abdominal pain, constipation, diarrhea and vomiting  Endocrine: Negative for cold intolerance, heat intolerance, polydipsia, polyphagia and polyuria  Genitourinary: Negative      Musculoskeletal: Negative  Skin: Negative  Allergic/Immunologic: Negative  Neurological: Negative  Negative for dizziness, syncope, light-headedness and headaches  Hematological: Negative  Psychiatric/Behavioral: Negative  All other systems reviewed and are negative  Historical Information   No past medical history on file  No past surgical history on file  Social History   Social History     Substance and Sexual Activity   Alcohol Use Not on file     Social History     Substance and Sexual Activity   Drug Use Not on file     Social History     Tobacco Use   Smoking Status Never Smoker   Smokeless Tobacco Never Used     Family History:   Family History   Problem Relation Age of Onset    Arthritis Mother     Heart disease Mother     Hypertension Mother     Hyperlipidemia Mother     Arthritis Father     Heart disease Father     Hypertension Father     Hyperlipidemia Father        Meds/Allergies   Current Outpatient Medications   Medication Sig Dispense Refill    Multiple Vitamins-Minerals (CENTRUM SILVER 50+WOMEN PO) Take by mouth      simvastatin (ZOCOR) 40 mg tablet Take 1 tablet (40 mg total) by mouth daily at bedtime 90 tablet 3    SYNTHROID 75 MCG tablet TAKE 1 TABLET DAILY MONDAY THROUGH SATURDAY, NOTHING ON SUNDAY 90 tablet 3    Calcium Carb-Cholecalciferol (CALCIUM 600/VITAMIN D3 PO) Take by mouth      Docusate Sodium (COLACE PO) Take by mouth       No current facility-administered medications for this visit  Allergies   Allergen Reactions    Tetracyclines & Related Rash       Objective     Physical Exam   Constitutional: She is oriented to person, place, and time  She appears well-developed and well-nourished  HENT:   Head: Normocephalic and atraumatic  Mouth/Throat: Oropharynx is clear and moist    Eyes: Pupils are equal, round, and reactive to light  Conjunctivae and EOM are normal    Neck: Normal range of motion  Neck supple     Cardiovascular: Normal rate, regular rhythm, normal heart sounds and intact distal pulses  Pulmonary/Chest: Effort normal and breath sounds normal    Abdominal: Soft  Bowel sounds are normal    Musculoskeletal: Normal range of motion  Neurological: She is alert and oriented to person, place, and time  Skin: Skin is warm and dry  Psychiatric: She has a normal mood and affect  Her behavior is normal  Judgment and thought content normal    Vitals reviewed  Lab Results:   Lab Results   Component Value Date/Time    Free t4 1 8 06/23/2020 08:39 AM    Free t4 1 5 03/11/2020 09:32 AM    Free t4 1 5 08/09/2019 08:59 AM     Portions of the record may have been created with voice recognition software  Occasional wrong word or "sound a like" substitutions may have occurred due to the inherent limitations of voice recognition software  Read the chart carefully and recognize, using context, where substitutions have occurred

## 2020-11-24 DIAGNOSIS — E03.9 HYPOTHYROIDISM, UNSPECIFIED TYPE: ICD-10-CM

## 2020-11-24 RX ORDER — LEVOTHYROXINE SODIUM 0.07 MG/1
TABLET ORAL
Qty: 90 TABLET | Refills: 3 | Status: SHIPPED | OUTPATIENT
Start: 2020-11-24 | End: 2021-01-13 | Stop reason: SDUPTHER

## 2021-01-08 LAB
ALBUMIN SERPL-MCNC: 4.6 G/DL (ref 3.6–5.1)
ALBUMIN/GLOB SERPL: 2 (CALC) (ref 1–2.5)
ALP SERPL-CCNC: 67 U/L (ref 37–153)
ALT SERPL-CCNC: 32 U/L (ref 6–29)
AST SERPL-CCNC: 23 U/L (ref 10–35)
BILIRUB SERPL-MCNC: 0.5 MG/DL (ref 0.2–1.2)
BUN SERPL-MCNC: 18 MG/DL (ref 7–25)
BUN/CREAT SERPL: ABNORMAL (CALC) (ref 6–22)
CALCIUM SERPL-MCNC: 10 MG/DL (ref 8.6–10.4)
CHLORIDE SERPL-SCNC: 104 MMOL/L (ref 98–110)
CHOLEST SERPL-MCNC: 185 MG/DL
CHOLEST/HDLC SERPL: 2.2 (CALC)
CO2 SERPL-SCNC: 29 MMOL/L (ref 20–32)
CREAT SERPL-MCNC: 0.72 MG/DL (ref 0.5–1.05)
GLOBULIN SER CALC-MCNC: 2.3 G/DL (CALC) (ref 1.9–3.7)
GLUCOSE SERPL-MCNC: 85 MG/DL (ref 65–99)
HDLC SERPL-MCNC: 85 MG/DL
LDLC SERPL CALC-MCNC: 85 MG/DL (CALC)
NONHDLC SERPL-MCNC: 100 MG/DL (CALC)
POTASSIUM SERPL-SCNC: 4.1 MMOL/L (ref 3.5–5.3)
PROT SERPL-MCNC: 6.9 G/DL (ref 6.1–8.1)
SL AMB EGFR AFRICAN AMERICAN: 110 ML/MIN/1.73M2
SL AMB EGFR NON AFRICAN AMERICAN: 95 ML/MIN/1.73M2
SODIUM SERPL-SCNC: 141 MMOL/L (ref 135–146)
T4 FREE SERPL-MCNC: 1.5 NG/DL (ref 0.8–1.8)
TRIGL SERPL-MCNC: 65 MG/DL
TSH SERPL-ACNC: 0.66 MIU/L

## 2021-01-13 ENCOUNTER — TELEMEDICINE (OUTPATIENT)
Dept: ENDOCRINOLOGY | Facility: HOSPITAL | Age: 56
End: 2021-01-13
Payer: COMMERCIAL

## 2021-01-13 DIAGNOSIS — E03.9 HYPOTHYROIDISM, UNSPECIFIED TYPE: ICD-10-CM

## 2021-01-13 DIAGNOSIS — E78.5 HYPERLIPIDEMIA, UNSPECIFIED HYPERLIPIDEMIA TYPE: ICD-10-CM

## 2021-01-13 PROCEDURE — 99211 OFF/OP EST MAY X REQ PHY/QHP: CPT | Performed by: INTERNAL MEDICINE

## 2021-01-13 RX ORDER — LEVOTHYROXINE SODIUM 0.07 MG/1
TABLET ORAL
Qty: 90 TABLET | Refills: 3 | Status: SHIPPED | OUTPATIENT
Start: 2021-01-13 | End: 2022-01-24

## 2021-01-13 RX ORDER — SIMVASTATIN 40 MG
40 TABLET ORAL
Qty: 90 TABLET | Refills: 3 | Status: SHIPPED | OUTPATIENT
Start: 2021-01-13 | End: 2022-01-24 | Stop reason: SDUPTHER

## 2021-01-13 NOTE — PROGRESS NOTES
Virtual Brief Visit    Assessment/Plan:    Problem List Items Addressed This Visit        Endocrine    Hypothyroidism    Relevant Medications    levothyroxine (Synthroid) 75 mcg tablet    Other Relevant Orders    TSH, 3rd generation    T4, free       Other    Hyperlipidemia    Relevant Medications    simvastatin (ZOCOR) 40 mg tablet    Other Relevant Orders    Comprehensive metabolic panel    Lipid Panel with Direct LDL reflex      A/P:  1  Hypothyroidism:  Overall doing well  She does have some hot flashes but  Suspects this is related more to her menopausal status  Discussed that her levels are normal though at the high end of normal we can consider decreasing her dose in the future to see if this helps  For now we will continue current regimen repeat labs prior to next appointment which will be in 6 months  2  Hyperlipidemia:  For now continue current regimen  We will repeat lipid panel prior to next appointment to see if we can taper her simvastatin dose at that time  Reason for visit is   Chief Complaint   Patient presents with    Virtual Brief Visit        Encounter provider Dee Dee Vasquez DO    Provider located at [address] [place], [place] [address] [place] [address]    Recent Visits  No visits were found meeting these conditions  Showing recent visits within past 7 days and meeting all other requirements     Today's Visits  Date Type Provider Dept   01/13/21 Telemedicine Dee Dee Vasquez DO [facility]   Showing today's visits and meeting all other requirements     Future Appointments  No visits were found meeting these conditions  Showing future appointments within next 150 days and meeting all other requirements        After connecting through telephone, the patient was identified by name and date of birth   Jc Gonsales was informed that this is a telemedicine visit and that the visit is being conducted through telephone  My office door was closed  No one else was in the room  She acknowledged consent and understanding of privacy and security of the platform  The patient has agreed to participate and understands she can discontinue the visit at any time  Patient is aware this is a billable service  Subjective    Codi Reza is a 48 y o  female   With history of hypothyroidism, hypertension, hyperlipidemia presents for a follow-up appointment  She also has history of gastric sleeve surgery  For hypothyroidism she is maintained on Synthroid  75 mcg Monday through Saturday and no tablet on Sunday  Hypertension she was on lisinopril in the past which was discontinued  For hyperlipidemia she is treated with simvastatin 40 mg daily  She presents today overall feeling well  No new health issues or symptoms of concern today  Ralf Snyder HPI     History reviewed  No pertinent past medical history  History reviewed  No pertinent surgical history  Current Outpatient Medications   Medication Sig Dispense Refill    levothyroxine (Synthroid) 75 mcg tablet TAKE 1 TABLET DAILY MONDAY THROUGH SATURDAY, NOTHING ON SUNDAY 90 tablet 3    Multiple Vitamins-Minerals (CENTRUM SILVER 50+WOMEN PO) Take by mouth      patient supplied medication Bonafide Relizen      simvastatin (ZOCOR) 40 mg tablet Take 1 tablet (40 mg total) by mouth daily at bedtime 90 tablet 3    Calcium Carb-Cholecalciferol (CALCIUM 600/VITAMIN D3 PO) Take by mouth      Docusate Sodium (COLACE PO) Take by mouth       No current facility-administered medications for this visit  Allergies   Allergen Reactions    Tetracyclines & Related Rash       Review of Systems   Constitutional: Negative for fatigue  HENT: Negative for trouble swallowing and voice change  Eyes: Negative for visual disturbance  Respiratory: Negative for shortness of breath      Cardiovascular: Negative for palpitations and leg swelling  Gastrointestinal: Negative for abdominal pain, nausea and vomiting  Endocrine: Negative for polydipsia and polyuria  Musculoskeletal: Negative for arthralgias and myalgias  Skin: Negative for rash  Neurological: Negative for dizziness, tremors and weakness  Hematological: Negative for adenopathy  Psychiatric/Behavioral: Negative for agitation and confusion  There were no vitals filed for this visit  Component      Latest Ref Rng & Units 1/7/2021   Glucose, Random      65 - 99 mg/dL 85   BUN      7 - 25 mg/dL 18   Creatinine      0 50 - 1 05 mg/dL 0 72   eGFR Non       > OR = 60 mL/min/1 73m2 95   eGFR       > OR = 60 mL/min/1 73m2 110   SL AMB BUN/CREATININE RATIO      6 - 22 (calc) NOT APPLICABLE   Sodium      135 - 146 mmol/L 141   Potassium      3 5 - 5 3 mmol/L 4 1   Chloride      98 - 110 mmol/L 104   CO2      20 - 32 mmol/L 29   Calcium      8 6 - 10 4 mg/dL 10 0   Total Protein      6 1 - 8 1 g/dL 6 9   Albumin      3 6 - 5 1 g/dL 4 6   Globulin      1 9 - 3 7 g/dL (calc) 2 3   Albumin/Globulin Ratio      1 0 - 2 5 (calc) 2 0   TOTAL BILIRUBIN      0 2 - 1 2 mg/dL 0 5   Alkaline Phosphatase      37 - 153 U/L 67   AST      10 - 35 U/L 23   ALT      6 - 29 U/L 32 (H)   Cholesterol      <200 mg/dL 185   HDL      > OR = 50 mg/dL 85   Triglycerides      <150 mg/dL 65   LDL Calculated      mg/dL (calc) 85   Chol HDLC Ratio      <5 0 (calc) 2 2   Non-HDL Cholesterol      <130 mg/dL (calc) 100   Free T4      0 8 - 1 8 ng/dL 1 5   TSH, POC      mIU/L 0 66       I spent 8 minutes directly with the patient during this visit    VIRTUAL VISIT DISCLAIMER    Adán Leo acknowledges that she has consented to an online visit or consultation   She understands that the online visit is based solely on information provided by her, and that, in the absence of a face-to-face physical evaluation by the physician, the diagnosis she receives is both limited and provisional in terms of accuracy and completeness  This is not intended to replace a full medical face-to-face evaluation by the physician  Brennen Hanna understands and accepts these terms

## 2021-01-14 ENCOUNTER — TELEPHONE (OUTPATIENT)
Dept: ADMINISTRATIVE | Facility: OTHER | Age: 56
End: 2021-01-14

## 2021-01-14 NOTE — LETTER
Procedure Request Form: Colonoscopy      Date Requested: 21  Patient: Webb Webb  Patient : 1965    Referring Provider: Milka Rene, DO - Endocrinology      Date of Procedure ______________________________       The above patient has informed us that they have completed their   most recent Colonoscopy at your facility  Please complete   this form and attach all corresponding procedure reports/results  Comments __________________________________________________________  ____________________________________________________________________  ____________________________________________________________________  ____________________________________________________________________    Facility Completing Procedure _________________________________________    Form Completed By (print name) _______________________________________      Signature __________________________________________________________      These reports are needed for  compliance    Please fax this completed form and a copy of the procedure report to our office located at [place] [address] as soon as possible to [phone] attention [facility]: Phone [phone]    We thank you for your assistance in treating our mutual patient

## 2021-01-14 NOTE — LETTER
2nd Request  Procedure Request Form: Colonoscopy      Date Requested: 21  Patient: Thomas Poet  Patient : 1965    Referring Provider: Robert Betts, DO- Endocrinology      Date of Procedure ______________________________       The above patient has informed us that they have completed their   most recent Colonoscopy at your facility  Please complete   this form and attach all corresponding procedure reports/results  Comments __________________________________________________________  ____________________________________________________________________  ____________________________________________________________________  ____________________________________________________________________    Facility Completing Procedure _________________________________________    Form Completed By (print name) _______________________________________      Signature __________________________________________________________      These reports are needed for  compliance    Please fax this completed form and a copy of the procedure report to our office located at [place] [address] as soon as possible to [phone] mariah Bedolla: Phone [phone]    We thank you for your assistance in treating our mutual patient

## 2021-01-14 NOTE — TELEPHONE ENCOUNTER
Upon review of the In Basket request and the patient's chart, initial outreach has been made via fax, please see Contacts section for details       Thank you  Lexi Villar

## 2021-01-14 NOTE — TELEPHONE ENCOUNTER
----- Message from Mary Lou Mcmillan, [address] sent at 1/13/2021  2:02 PM EST -----  Regarding: colonoscopy  01/13/21 2:02 PM    Hello, our patient Roderick [place] has had a colonoscopy completed/performed  Please assist in updating the patient chart by Kayla Sanchez [facility]  [phone]  The date of service is 2018      Thank you,  Mary Lou Mcmillan MA  [facility] Earline Min

## 2021-01-14 NOTE — LETTER
Procedure Request Form: Colonoscopy      Date Requested: 21  Patient: Almthomas Peterson  Patient : 1965   Referring Provider: Elan Dakotah, MD        Date of Procedure ______________________________       The above patient has informed us that they have completed their   most recent Colonoscopy at your facility  Please complete   this form and attach all corresponding procedure reports/results  Comments __________________________________________________________  ____________________________________________________________________  ____________________________________________________________________  ____________________________________________________________________    Facility Completing Procedure _________________________________________    Form Completed By (print name) _______________________________________      Signature __________________________________________________________      These reports are needed for  compliance    Please fax this completed form and a copy of the procedure report to our office located at [place] [address] as soon as possible to [phone] attention Isela Smith: Phone [phone]    We thank you for your assistance in treating our mutual patient

## 2021-01-19 NOTE — TELEPHONE ENCOUNTER
As a follow-up, a second attempt has been made for outreach via fax, please see Contacts section for details      Dr Nolan Leblanc  [phone]    Thank you  Rico Cannon

## 2021-01-25 NOTE — TELEPHONE ENCOUNTER
As a final attempt, a third outreach has been made via telephone call  The outcome of the telephone call was a fax request form to be sent to Office  Please see Contacts section for details  This encounter will be closed and completed by end of day  Should we receive the requested information because of previous outreach attempts, the requested patient's chart will be updated appropriately       Thank you  Yajaira Hill

## 2021-07-09 LAB
ALBUMIN SERPL-MCNC: 4.1 G/DL (ref 3.6–5.1)
ALBUMIN/GLOB SERPL: 2.1 (CALC) (ref 1–2.5)
ALP SERPL-CCNC: 73 U/L (ref 37–153)
ALT SERPL-CCNC: 35 U/L (ref 6–29)
AST SERPL-CCNC: 29 U/L (ref 10–35)
BILIRUB SERPL-MCNC: 0.4 MG/DL (ref 0.2–1.2)
BUN SERPL-MCNC: 16 MG/DL (ref 7–25)
BUN/CREAT SERPL: ABNORMAL (CALC) (ref 6–22)
CALCIUM SERPL-MCNC: 9.5 MG/DL (ref 8.6–10.4)
CHLORIDE SERPL-SCNC: 104 MMOL/L (ref 98–110)
CHOLEST SERPL-MCNC: 179 MG/DL
CHOLEST/HDLC SERPL: 2.1 (CALC)
CO2 SERPL-SCNC: 30 MMOL/L (ref 20–32)
CREAT SERPL-MCNC: 0.78 MG/DL (ref 0.5–1.05)
GLOBULIN SER CALC-MCNC: 2 G/DL (CALC) (ref 1.9–3.7)
GLUCOSE SERPL-MCNC: 91 MG/DL (ref 65–99)
HDLC SERPL-MCNC: 84 MG/DL
LDLC SERPL CALC-MCNC: 80 MG/DL (CALC)
NONHDLC SERPL-MCNC: 95 MG/DL (CALC)
POTASSIUM SERPL-SCNC: 4.1 MMOL/L (ref 3.5–5.3)
PROT SERPL-MCNC: 6.1 G/DL (ref 6.1–8.1)
SL AMB EGFR AFRICAN AMERICAN: 99 ML/MIN/1.73M2
SL AMB EGFR NON AFRICAN AMERICAN: 86 ML/MIN/1.73M2
SODIUM SERPL-SCNC: 141 MMOL/L (ref 135–146)
T4 FREE SERPL-MCNC: 1.6 NG/DL (ref 0.8–1.8)
TRIGL SERPL-MCNC: 69 MG/DL
TSH SERPL-ACNC: 1 MIU/L

## 2021-08-04 ENCOUNTER — OFFICE VISIT (OUTPATIENT)
Dept: ENDOCRINOLOGY | Facility: HOSPITAL | Age: 56
End: 2021-08-04
Payer: COMMERCIAL

## 2021-08-04 VITALS
HEART RATE: 50 BPM | BODY MASS INDEX: 26.46 KG/M2 | SYSTOLIC BLOOD PRESSURE: 128 MMHG | WEIGHT: 155 LBS | HEIGHT: 64 IN | DIASTOLIC BLOOD PRESSURE: 80 MMHG

## 2021-08-04 DIAGNOSIS — E78.5 HYPERLIPIDEMIA, UNSPECIFIED HYPERLIPIDEMIA TYPE: ICD-10-CM

## 2021-08-04 DIAGNOSIS — E03.9 HYPOTHYROIDISM, UNSPECIFIED TYPE: Primary | ICD-10-CM

## 2021-08-04 PROCEDURE — 99213 OFFICE O/P EST LOW 20 MIN: CPT | Performed by: PHYSICIAN ASSISTANT

## 2021-08-04 NOTE — PROGRESS NOTES
Lolita Jeffers 55 y o  female MRN: [id number]    Encounter: [id number]      Assessment/Plan     Assessment: This is a 55y o -year-old female with hypothyroidism and hyperlipidemia  Plan:    1  Hypothyroidism:  Thyroid lab work came back normal   Clinically and biochemically euthyroid  At this time she will continue with Synthroid 75 mcg 1 tablet 6 days we can no tablet on Sundays  Contact the office if there is any change in symptoms  At this time will have her follow-up in 1 year with lab work completed prior to visit  2  Hyperlipidemia:  Cholesterol looking excellent on simvastatin  Encourage her to continue taking medication to help minimize risk of cardiovascular events  Will continue to monitor at this time  CC:   Hypothyroidism follow-up    History of Present Illness     HPI:  Lolita Jeffers is a 55 y o  female with hypothyroidism, hypertension, hyperlipidemia presents for a follow-up appointment  She also has history of gastric sleeve surgery  For hypothyroidism she is maintained on Synthroid 75 mcg Monday through Saturday and no tablet on Sunday  For hyperlipidemia she is treated with simvastatin 40 mg daily  She presents today overall feeling well  She currently denies any fatigue, heat or cold intolerance, palpitations, abdominal pain, diarrhea or constipation, tremors, excessive dry skin or hair loss, insomnia, anxiety or depression  No new health issues or symptoms of concern today  Review of Systems   Constitutional: Negative for activity change, appetite change, diaphoresis, fatigue and unexpected weight change  HENT: Negative for sore throat, trouble swallowing and voice change  Eyes: Negative for visual disturbance  Respiratory: Negative for chest tightness and shortness of breath  Cardiovascular: Negative for chest pain, palpitations and leg swelling  Gastrointestinal: Negative for abdominal pain, constipation and diarrhea     Endocrine: Negative for cold intolerance, heat intolerance, polydipsia, polyphagia and polyuria  Skin: Negative for rash  Neurological: Negative for dizziness, tremors, light-headedness, numbness and headaches  Hematological: Negative for adenopathy  Psychiatric/Behavioral: Negative for dysphoric mood and sleep disturbance  The patient is not nervous/anxious  Historical Information   No past medical history on file  No past surgical history on file  Social History   Social History     Substance and Sexual Activity   Alcohol Use None     Social History     Substance and Sexual Activity   Drug Use Not on file     Social History     Tobacco Use   Smoking Status Never Smoker   Smokeless Tobacco Never Used     Family History:   Family History   Problem Relation Age of Onset    Arthritis Mother     Heart disease Mother     Hypertension Mother     Hyperlipidemia Mother     Arthritis Father     Heart disease Father     Hypertension Father     Hyperlipidemia Father        Meds/Allergies   Current Outpatient Medications   Medication Sig Dispense Refill    Calcium Carb-Cholecalciferol (CALCIUM 600/VITAMIN D3 PO) Take by mouth      Docusate Sodium (COLACE PO) Take by mouth      levothyroxine (Synthroid) 75 mcg tablet TAKE 1 TABLET DAILY MONDAY THROUGH SATURDAY, NOTHING ON SUNDAY 90 tablet 3    Multiple Vitamins-Minerals (CENTRUM SILVER 50+WOMEN PO) Take by mouth      patient supplied medication Bonafide Relizen      simvastatin (ZOCOR) 40 mg tablet Take 1 tablet (40 mg total) by mouth daily at bedtime 90 tablet 3     No current facility-administered medications for this visit  Allergies   Allergen Reactions    Tetracyclines & Related Rash       Objective   Vitals: Height 5' 4" (1 626 m)  Physical Exam  Vitals and nursing note reviewed  Constitutional:       General: She is not in acute distress  HENT:      Head: Normocephalic and atraumatic  Eyes:      Pupils: Pupils are equal, round, and reactive to light     Cardiovascular: Rate and Rhythm: Normal rate and regular rhythm  Heart sounds: No murmur heard  Pulmonary:      Effort: Pulmonary effort is normal  No respiratory distress  Breath sounds: Normal breath sounds  No wheezing  Musculoskeletal:         General: No swelling  Cervical back: Normal range of motion  Lymphadenopathy:      Cervical: No cervical adenopathy  Neurological:      Mental Status: She is alert and oriented to person, place, and time  Sensory: No sensory deficit  Psychiatric:         Mood and Affect: Mood normal          Behavior: Behavior normal          Thought Content: Thought content normal          The history was obtained from the review of the chart, patient  Lab Results:   Lab Results   Component Value Date/Time    Free t4 1 6 07/09/2021 08:06 AM    Free t4 1 5 01/07/2021 08:56 AM       Portions of the record may have been created with voice recognition software  Occasional wrong word or "sound a like" substitutions may have occurred due to the inherent limitations of voice recognition software  Read the chart carefully and recognize, using context, where substitutions have occurred

## 2021-08-04 NOTE — PATIENT INSTRUCTIONS
Continue with Synthroid 75 mg 1 tablet 6 days a week in no tablet on Sundays  Continue simvastatin 40 mg daily  Call the office if any change in symptoms  Follow up in 1 year with lab work prior to visit  Yes

## 2022-01-24 DIAGNOSIS — E78.5 HYPERLIPIDEMIA, UNSPECIFIED HYPERLIPIDEMIA TYPE: ICD-10-CM

## 2022-01-24 DIAGNOSIS — E03.9 HYPOTHYROIDISM, UNSPECIFIED TYPE: ICD-10-CM

## 2022-01-24 RX ORDER — LEVOTHYROXINE SODIUM 0.07 MG/1
TABLET ORAL
Qty: 90 TABLET | Refills: 3 | Status: SHIPPED | OUTPATIENT
Start: 2022-01-24

## 2022-01-24 RX ORDER — SIMVASTATIN 40 MG
40 TABLET ORAL
Qty: 90 TABLET | Refills: 3 | Status: SHIPPED | OUTPATIENT
Start: 2022-01-24

## 2022-08-01 ENCOUNTER — TELEPHONE (OUTPATIENT)
Dept: ENDOCRINOLOGY | Facility: HOSPITAL | Age: 57
End: 2022-08-01

## 2022-08-01 NOTE — TELEPHONE ENCOUNTER
Could you please issue new blood work for [employer]  She had to reschedule until September and she said the current blood work expires on 8/4/22    Thank You

## 2022-08-02 DIAGNOSIS — E03.9 HYPOTHYROIDISM, UNSPECIFIED TYPE: Primary | ICD-10-CM

## 2022-08-02 DIAGNOSIS — E78.5 HYPERLIPIDEMIA, UNSPECIFIED HYPERLIPIDEMIA TYPE: ICD-10-CM

## 2022-08-31 LAB
CHOLEST SERPL-MCNC: 170 MG/DL
CHOLEST/HDLC SERPL: 1.9 (CALC)
HDLC SERPL-MCNC: 89 MG/DL
LDLC SERPL CALC-MCNC: 67 MG/DL (CALC)
NONHDLC SERPL-MCNC: 81 MG/DL (CALC)
T4 FREE SERPL-MCNC: 1.8 NG/DL (ref 0.8–1.8)
TRIGL SERPL-MCNC: 61 MG/DL
TSH SERPL-ACNC: 0.61 MIU/L (ref 0.4–4.5)

## 2022-09-21 ENCOUNTER — OFFICE VISIT (OUTPATIENT)
Dept: ENDOCRINOLOGY | Facility: HOSPITAL | Age: 57
End: 2022-09-21
Payer: COMMERCIAL

## 2022-09-21 VITALS
SYSTOLIC BLOOD PRESSURE: 130 MMHG | BODY MASS INDEX: 27.04 KG/M2 | HEIGHT: 64 IN | DIASTOLIC BLOOD PRESSURE: 84 MMHG | WEIGHT: 158.4 LBS | HEART RATE: 64 BPM

## 2022-09-21 DIAGNOSIS — E03.9 HYPOTHYROIDISM, UNSPECIFIED TYPE: Primary | ICD-10-CM

## 2022-09-21 DIAGNOSIS — E78.5 HYPERLIPIDEMIA, UNSPECIFIED HYPERLIPIDEMIA TYPE: ICD-10-CM

## 2022-09-21 PROCEDURE — 99213 OFFICE O/P EST LOW 20 MIN: CPT | Performed by: NURSE PRACTITIONER

## 2022-09-21 NOTE — PROGRESS NOTES
Vaishali Hayden 65 y o  female MRN: [id number]    Encounter: [id number]      Assessment/Plan     Assessment: This is a 65y o -year-old female with  hypothyroidism and hyperlipidemia  Plan:  1   Hypothyroidism: Oswaldo Munroe most recent TSH and free T4 are normal  Continue Synthroid at current dose    The patient has lost about 85 lb since her gastric sleeve surgery   Check TSH and free T4 prior to next visit        2   Hyperlipidemia: Continue simvastatin at current dose   Check fasting lipid panel prior to next office visit        CC: Hypothyroidism follow-up       History of Present Illness     HPI:  60 y  o  year old female with history of hypothyroidism, hypertension, hyperlipidemia who presents for a follow-up appointment  Socratessunday Coughlin is postop status post gastric sleeve surgery in [place]   She is currently taking Synthroid 75 mcg daily Monday through Saturday with no tablet on Sunday  Oswaldo Munroe most recent TSH from August 31, 2022 is 0 61 with a free T4 of 1 8   She complains of some lingering insomnia and heat intolerance which has improved since lowering her dose      For her hypertension, she was treated with lisinopril 5 mg daily in the past but this was discontinued over the past few months      Her hyperlipidemia is treated with simvastatin 40 mg daily       Review of Systems   Constitutional: Negative  Negative for chills, fatigue and fever  HENT: Negative  Negative for trouble swallowing and voice change  Eyes: Negative  Negative for photophobia, pain, discharge, redness, itching and visual disturbance  Respiratory: Negative  Negative for chest tightness and shortness of breath  Cardiovascular: Negative  Negative for chest pain  Gastrointestinal: Negative  Negative for abdominal pain, constipation, diarrhea and vomiting  Endocrine: Negative for cold intolerance, heat intolerance, polydipsia, polyphagia and polyuria  Genitourinary: Negative  Musculoskeletal: Negative  Skin: Negative  Allergic/Immunologic: Negative  Neurological: Negative  Negative for dizziness, syncope, light-headedness and headaches  Hematological: Negative  Psychiatric/Behavioral: Negative  All other systems reviewed and are negative  Historical Information   History reviewed  No pertinent past medical history  History reviewed  No pertinent surgical history  Social History   Social History     Substance and Sexual Activity   Alcohol Use None     Social History     Substance and Sexual Activity   Drug Use Not on file     Social History     Tobacco Use   Smoking Status Never Smoker   Smokeless Tobacco Never Used     Family History:   Family History   Problem Relation Age of Onset    Arthritis Mother     Heart disease Mother     Hypertension Mother     Hyperlipidemia Mother     Arthritis Father     Heart disease Father     Hypertension Father     Hyperlipidemia Father        Meds/Allergies   Current Outpatient Medications   Medication Sig Dispense Refill    levothyroxine 75 mcg tablet TAKE 1 TABLET DAILY MONDAY THROUGH SATURDAY, NOTHING ON SUNDAY 90 tablet 3    Multiple Vitamins-Minerals (CENTRUM SILVER 50+WOMEN PO) Take by mouth      simvastatin (ZOCOR) 40 mg tablet Take 1 tablet (40 mg total) by mouth daily at bedtime 90 tablet 3    Calcium Carb-Cholecalciferol (CALCIUM 600/VITAMIN D3 PO) Take by mouth (Patient not taking: No sig reported)      Docusate Sodium (COLACE PO) Take by mouth (Patient not taking: No sig reported)      patient supplied medication Bonafide Relizen (Patient not taking: No sig reported)       No current facility-administered medications for this visit  Allergies   Allergen Reactions    Tetracyclines & Related Rash       Objective   Vitals: Blood pressure 130/84, pulse 64, height 5' 3 78" (1 62 m), weight 71 8 kg (158 lb 6 4 oz)  Physical Exam  Vitals reviewed  Constitutional:       Appearance: She is well-developed     HENT:      Head: Normocephalic and atraumatic  Eyes:      Conjunctiva/sclera: Conjunctivae normal       Pupils: Pupils are equal, round, and reactive to light  Cardiovascular:      Rate and Rhythm: Normal rate and regular rhythm  Heart sounds: Normal heart sounds  Pulmonary:      Effort: Pulmonary effort is normal       Breath sounds: Normal breath sounds  Abdominal:      General: Bowel sounds are normal       Palpations: Abdomen is soft  Musculoskeletal:         General: Normal range of motion  Cervical back: Normal range of motion and neck supple  Skin:     General: Skin is warm and dry  Neurological:      Mental Status: She is alert and oriented to person, place, and time  Psychiatric:         Behavior: Behavior normal          Thought Content: Thought content normal          Judgment: Judgment normal        Lab Results:   Lab Results   Component Value Date/Time    Free t4 1 8 08/31/2022 09:03 AM     Portions of the record may have been created with voice recognition software  Occasional wrong word or "sound a like" substitutions may have occurred due to the inherent limitations of voice recognition software  Read the chart carefully and recognize, using context, where substitutions have occurred

## 2022-09-21 NOTE — PATIENT INSTRUCTIONS
For now, continue Synthroid at 75 mcg dose Monday through Saturday and no tablet on Sunday  Continue simvastatin, as discussed  Obtain lab work as prescribed in 6 months and 1 year prior to next appointment

## 2022-09-23 ENCOUNTER — TELEPHONE (OUTPATIENT)
Dept: ADMINISTRATIVE | Facility: OTHER | Age: 57
End: 2022-09-23

## 2022-09-23 NOTE — TELEPHONE ENCOUNTER
----- Message from [address] [facility] sent at 9/21/2022  9:34 AM EDT -----  Regarding: [facility]  09/21/22 9:34 AM    Hello, our patient Vaishali Hayden has had a Colonoscopy performed by Dr Bogdan Arevalo  Her number is [phone]      Thank you,  [facility]

## 2022-09-23 NOTE — LETTER
Procedure Request Form: Colonoscopy      Date Requested: 10/06/22  Patient: Betzaida Lambert  Patient : 1965   Referring Provider: Daina Guzman, MD        Date of Procedure ______________________________       The above patient has informed us that they have completed their   most recent Colonoscopy at your facility  Please complete   this form and attach all corresponding procedure reports/results  Comments __________________________________________________________  ____________________________________________________________________  ____________________________________________________________________  ____________________________________________________________________    Facility Completing Procedure _________________________________________    Form Completed By (print name) _______________________________________      Signature __________________________________________________________      These reports are needed for  compliance  Please fax this completed form and a copy of the procedure report to our office located at [place] [address] as soon as possible to [phone] attention Fadia Taylor: Phone [phone]    We thank you for your assistance in treating our mutual patient

## 2022-09-26 NOTE — TELEPHONE ENCOUNTER
Upon review of the In Basket request and the patient's chart, initial outreach has been made via fax, please see Contacts section for details       Thank you  Yesenia Peña

## 2022-10-06 NOTE — TELEPHONE ENCOUNTER
As a follow-up, a second attempt has been made for outreach via fax, please see Contacts section for details      Thank you  Shannon Alvarado

## 2022-10-19 NOTE — TELEPHONE ENCOUNTER
As a final attempt, a third outreach has been made via telephone call  Please see Contacts section for details  This encounter will be closed and completed by end of day  Should we receive the requested information because of previous outreach attempts, the requested patient's chart will be updated appropriately       Thank you  Aminata Locke

## 2022-12-18 NOTE — TELEPHONE ENCOUNTER
Can you order labs
Mailed
Ordered 
Overall her labs look good  Thyroid function looks right on target on her current dose  We can repeat her labs in a month prior to her next appointment and include a TSH, free T4, CMP to trend so we have data to discuss at her appointment 
Pt informed
Pt rescheduled 3/18 to 4/23  She had labs done 3/11/20  Can she get results? She also needs renewal of simvastatin 40mg/ once daily/ 90 day supply[facility]   Thanks
n/a

## 2023-01-25 DIAGNOSIS — E03.9 HYPOTHYROIDISM, UNSPECIFIED TYPE: ICD-10-CM

## 2023-01-25 DIAGNOSIS — E78.5 HYPERLIPIDEMIA, UNSPECIFIED HYPERLIPIDEMIA TYPE: ICD-10-CM

## 2023-01-25 RX ORDER — LEVOTHYROXINE SODIUM 0.07 MG/1
TABLET ORAL
Qty: 90 TABLET | Refills: 3 | Status: SHIPPED | OUTPATIENT
Start: 2023-01-25

## 2023-01-25 RX ORDER — SIMVASTATIN 40 MG
40 TABLET ORAL
Qty: 90 TABLET | Refills: 3 | Status: SHIPPED | OUTPATIENT
Start: 2023-01-25

## 2023-01-30 LAB
T4 FREE SERPL-MCNC: 1.4 NG/DL (ref 0.8–1.8)
TSH SERPL-ACNC: 1.35 MIU/L (ref 0.4–4.5)

## 2023-08-04 LAB
CHOLEST SERPL-MCNC: 198 MG/DL
CHOLEST/HDLC SERPL: 2.3 (CALC)
HDLC SERPL-MCNC: 88 MG/DL
LDLC SERPL CALC-MCNC: 92 MG/DL (CALC)
NONHDLC SERPL-MCNC: 110 MG/DL (CALC)
T4 FREE SERPL-MCNC: 1.5 NG/DL (ref 0.8–1.8)
TRIGL SERPL-MCNC: 90 MG/DL
TSH SERPL-ACNC: 0.86 MIU/L (ref 0.4–4.5)

## 2023-09-27 ENCOUNTER — OFFICE VISIT (OUTPATIENT)
Dept: ENDOCRINOLOGY | Facility: HOSPITAL | Age: 58
End: 2023-09-27
Payer: COMMERCIAL

## 2023-09-27 ENCOUNTER — DOCUMENTATION (OUTPATIENT)
Dept: ENDOCRINOLOGY | Facility: HOSPITAL | Age: 58
End: 2023-09-27

## 2023-09-27 VITALS
WEIGHT: 169 LBS | HEIGHT: 64 IN | HEART RATE: 66 BPM | OXYGEN SATURATION: 99 % | SYSTOLIC BLOOD PRESSURE: 142 MMHG | BODY MASS INDEX: 28.85 KG/M2 | DIASTOLIC BLOOD PRESSURE: 88 MMHG

## 2023-09-27 DIAGNOSIS — E78.5 HYPERLIPIDEMIA, UNSPECIFIED HYPERLIPIDEMIA TYPE: ICD-10-CM

## 2023-09-27 DIAGNOSIS — E03.9 HYPOTHYROIDISM, UNSPECIFIED TYPE: Primary | ICD-10-CM

## 2023-09-27 PROCEDURE — 99214 OFFICE O/P EST MOD 30 MIN: CPT | Performed by: NURSE PRACTITIONER

## 2023-09-27 RX ORDER — LEVOTHYROXINE SODIUM 75 MCG
75 TABLET ORAL DAILY
Qty: 90 TABLET | Refills: 3 | Status: SHIPPED | OUTPATIENT
Start: 2023-09-27

## 2023-09-27 NOTE — PROGRESS NOTES
Georgia Sol 63 y.o. female MRN: [id number]    Encounter: [id number]      Assessment/Plan     Assessment: This is a 63y.o.-year-old female with hypothyroidism and hyperlipidemia. Plan:  1. Hypothyroidism:  Her most recent TSH and free T4 are normal. Continue Synthroid at current dose. She has lost about 85 lb since her gastric sleeve surgery. Check TSH and free T4 prior to next visit. 2.  Hyperlipidemia: Continue simvastatin at current dose. Check fasting lipid panel prior to next office visit. CC: Hypothyroidism Follow up    History of Present Illness     HPI:  63y.o. year old female with history of hypothyroidism, hypertension, hyperlipidemia who presents for a follow-up appointment. She is postop status post gastric sleeve surgery at [facility]. She is currently taking Synthroid 75 mcg daily Monday through Saturday with no tablet on Sunday. Her most recent TSH from August 4, 2022 is 0.86 with a free T4 of 1.5. For her hypertension, she was treated with lisinopril 5 mg daily in the past but this was discontinued over the past few months. Her hyperlipidemia is treated with simvastatin 40 mg daily. Review of Systems   Constitutional:  Positive for fatigue. Negative for chills and fever. HENT: Negative. Negative for trouble swallowing and voice change. Eyes: Negative. Negative for photophobia, pain, discharge, redness, itching and visual disturbance. Respiratory: Negative. Negative for chest tightness and shortness of breath. Cardiovascular: Negative. Negative for chest pain and palpitations. Gastrointestinal: Negative. Negative for abdominal pain, constipation, diarrhea and vomiting. Endocrine: Negative for cold intolerance, heat intolerance, polydipsia, polyphagia and polyuria. Genitourinary: Negative. Musculoskeletal:  Positive for myalgias. Skin: Negative. Allergic/Immunologic: Negative. Neurological: Negative.   Negative for dizziness, syncope, light-headedness and headaches. Hematological: Negative. Psychiatric/Behavioral: Negative. All other systems reviewed and are negative. Historical Information   History reviewed. No pertinent past medical history. History reviewed. No pertinent surgical history. Social History   Social History     Substance and Sexual Activity   Alcohol Use None     Social History     Substance and Sexual Activity   Drug Use Not on file     Social History     Tobacco Use   Smoking Status Never   Smokeless Tobacco Never     Family History:   Family History   Problem Relation Age of Onset    Arthritis Mother     Heart disease Mother     Hypertension Mother     Hyperlipidemia Mother     Arthritis Father     Heart disease Father     Hypertension Father     Hyperlipidemia Father        Meds/Allergies   Current Outpatient Medications   Medication Sig Dispense Refill    levothyroxine 75 mcg tablet Take one tablet daily Monday through Saturday and no tablet on Sunday 90 tablet 3    Multiple Vitamins-Minerals (CENTRUM SILVER 50+WOMEN PO) Take by mouth      simvastatin (ZOCOR) 40 mg tablet Take 1 tablet (40 mg total) by mouth daily at bedtime 90 tablet 3    Calcium Carb-Cholecalciferol (CALCIUM 600/VITAMIN D3 PO) Take by mouth (Patient not taking: Reported on 8/4/2021)      Docusate Sodium (COLACE PO) Take by mouth (Patient not taking: Reported on 8/4/2021)      patient supplied medication Bonafide Relizen (Patient not taking: Reported on 8/4/2021)       No current facility-administered medications for this visit. Allergies   Allergen Reactions    Tetracyclines & Related Rash       Objective   Vitals: Blood pressure 142/88, pulse 66, height 5' 3.75" (1.619 m), weight 76.7 kg (169 lb), SpO2 99 %. Physical Exam  Vitals reviewed. Constitutional:       Appearance: She is well-developed. HENT:      Head: Normocephalic and atraumatic.    Eyes:      Conjunctiva/sclera: Conjunctivae normal.      Pupils: Pupils are equal, round, and reactive to light. Cardiovascular:      Rate and Rhythm: Normal rate and regular rhythm. Heart sounds: Normal heart sounds. Pulmonary:      Effort: Pulmonary effort is normal.      Breath sounds: Normal breath sounds. Abdominal:      General: Bowel sounds are normal.      Palpations: Abdomen is soft. Musculoskeletal:         General: Normal range of motion. Cervical back: Normal range of motion and neck supple. Skin:     General: Skin is warm and dry. Neurological:      Mental Status: She is alert and oriented to person, place, and time. Psychiatric:         Behavior: Behavior normal.         Thought Content: Thought content normal.         Judgment: Judgment normal.         Lab Results:   Lab Results   Component Value Date/Time    HDL 88 08/04/2023 09:25 AM    Triglycerides 90 08/04/2023 09:25 AM     Portions of the record may have been created with voice recognition software. Occasional wrong word or "sound a like" substitutions may have occurred due to the inherent limitations of voice recognition software. Read the chart carefully and recognize, using context, where substitutions have occurred.

## 2023-09-27 NOTE — PROGRESS NOTES
A medication request came over from the patient's pharmacy requesting L-thyroxine in replacement of original prescription.   Provider reviewed and signed and it was faxed to [phone]

## 2023-09-27 NOTE — PATIENT INSTRUCTIONS
For now, continue Synthroid at 75 mcg dose Monday through Saturday and no tablet on Sunday. Continue simvastatin, as discussed. Complete lab work prior to next visit.

## 2024-01-24 DIAGNOSIS — E78.5 HYPERLIPIDEMIA, UNSPECIFIED HYPERLIPIDEMIA TYPE: Primary | ICD-10-CM

## 2024-01-24 RX ORDER — SIMVASTATIN 40 MG
40 TABLET ORAL
Qty: 90 TABLET | Refills: 3 | Status: SHIPPED | OUTPATIENT
Start: 2024-01-24

## 2024-08-07 LAB
CHOLEST SERPL-MCNC: 192 MG/DL
CHOLEST/HDLC SERPL: 2.2 (CALC)
HDLC SERPL-MCNC: 87 MG/DL
LDLC SERPL CALC-MCNC: 88 MG/DL (CALC)
NONHDLC SERPL-MCNC: 105 MG/DL (CALC)
T4 FREE SERPL-MCNC: 1.5 NG/DL (ref 0.8–1.8)
TRIGL SERPL-MCNC: 79 MG/DL
TSH SERPL-ACNC: 0.93 MIU/L (ref 0.4–4.5)

## 2024-08-12 ENCOUNTER — TELEPHONE (OUTPATIENT)
Dept: ENDOCRINOLOGY | Facility: HOSPITAL | Age: 59
End: 2024-08-12

## 2024-08-12 NOTE — TELEPHONE ENCOUNTER
----- Message from VITO Nguyen sent at 8/10/2024  1:01 PM EDT -----  Please call the patient regarding result.  Thyroid lab work and CMP look good

## 2024-09-27 ENCOUNTER — OFFICE VISIT (OUTPATIENT)
Dept: ENDOCRINOLOGY | Facility: HOSPITAL | Age: 59
End: 2024-09-27
Payer: COMMERCIAL

## 2024-09-27 VITALS
BODY MASS INDEX: 29.64 KG/M2 | HEIGHT: 64 IN | SYSTOLIC BLOOD PRESSURE: 120 MMHG | DIASTOLIC BLOOD PRESSURE: 80 MMHG | HEART RATE: 68 BPM | WEIGHT: 173.6 LBS

## 2024-09-27 DIAGNOSIS — E03.9 HYPOTHYROIDISM, UNSPECIFIED TYPE: Primary | ICD-10-CM

## 2024-09-27 DIAGNOSIS — E78.5 HYPERLIPIDEMIA, UNSPECIFIED HYPERLIPIDEMIA TYPE: ICD-10-CM

## 2024-09-27 PROCEDURE — 99214 OFFICE O/P EST MOD 30 MIN: CPT | Performed by: NURSE PRACTITIONER

## 2024-09-27 RX ORDER — LEVOTHYROXINE SODIUM 75 UG/1
75 TABLET ORAL DAILY
Qty: 90 TABLET | Refills: 3 | Status: SHIPPED | OUTPATIENT
Start: 2024-09-27

## 2024-09-27 NOTE — PROGRESS NOTES
Rosamaria Goins 59 y.o. female MRN: [id number]    Encounter: [id number]      Assessment & Plan     Assessment:  This is a 59 y.o.-year-old female with hypothyroidism and hyperlipidemia.     Plan:  1.  Hypothyroidism:  Her most recent TSH and free T4 are normal. Continue Synthroid at current dose.  She has lost about 85 lb since her gastric sleeve surgery.  Check TSH and free T4 prior to next visit.       2.  Hyperlipidemia: Continue simvastatin at current dose.  Check fasting lipid panel prior to next office visit.    CC: Hypothyroidism Follow up     History of Present Illness     HPI:  58 y.o. year old female with history of hypothyroidism, hypertension, hyperlipidemia who presents for a follow-up appointment.  She is postop status post gastric sleeve surgery at [place] approximately 6 years ago.  She is currently taking Synthroid 75 mcg daily Monday through Saturday with no tablet on Sunday.  Her most recent TSH from August 7, 2024 is 0.93 with a free T4 of 1.5.       For her hypertension, she was treated with lisinopril 5 mg daily in the past but this was discontinued over the past few months.     Her hyperlipidemia is treated with simvastatin 40 mg daily.      Review of Systems   Constitutional:  Positive for fatigue. Negative for chills and fever.   HENT: Negative.  Negative for trouble swallowing and voice change.    Eyes: Negative.  Negative for photophobia, pain, discharge, redness, itching and visual disturbance.   Respiratory: Negative.  Negative for chest tightness and shortness of breath.    Cardiovascular: Negative.  Negative for chest pain.   Gastrointestinal: Negative.  Negative for abdominal pain, constipation, diarrhea and vomiting.   Endocrine: Negative for cold intolerance, heat intolerance, polydipsia, polyphagia and polyuria.   Genitourinary: Negative.    Musculoskeletal:  Positive for myalgias.   Skin: Negative.    Allergic/Immunologic: Negative.    Neurological: Negative.  Negative for  dizziness, syncope, light-headedness and headaches.   Hematological: Negative.    Psychiatric/Behavioral: Negative.     All other systems reviewed and are negative.      Historical Information   History reviewed. No pertinent past medical history.  History reviewed. No pertinent surgical history.  Social History   Social History     Substance and Sexual Activity   Alcohol Use None     Social History     Substance and Sexual Activity   Drug Use Not on file     Social History     Tobacco Use   Smoking Status Never   Smokeless Tobacco Never     Family History:   Family History   Problem Relation Age of Onset    Arthritis Mother     Heart disease Mother     Hypertension Mother     Hyperlipidemia Mother     Arthritis Father     Heart disease Father     Hypertension Father     Hyperlipidemia Father        Meds/Allergies   Current Outpatient Medications   Medication Sig Dispense Refill    Calcium Carb-Cholecalciferol (CALCIUM 600/VITAMIN D3 PO) Take by mouth (Patient not taking: Reported on 8/4/2021)      Docusate Sodium (COLACE PO) Take by mouth (Patient not taking: Reported on 8/4/2021)      Multiple Vitamins-Minerals (CENTRUM SILVER 50+WOMEN PO) Take by mouth      patient supplied medication Bonafide Relizen (Patient not taking: Reported on 8/4/2021)      simvastatin (ZOCOR) 40 mg tablet Take 1 tablet (40 mg total) by mouth daily at bedtime 90 tablet 3    Synthroid 75 MCG tablet Take 1 tablet (75 mcg total) by mouth daily BRAND NECESSARY 90 tablet 3     No current facility-administered medications for this visit.     Allergies   Allergen Reactions    Tetracyclines & Related Rash       Objective   Vitals: There were no vitals taken for this visit.    Physical Exam  Vitals reviewed.   Constitutional:       Appearance: She is well-developed.   HENT:      Head: Normocephalic and atraumatic.   Eyes:      Conjunctiva/sclera: Conjunctivae normal.      Pupils: Pupils are equal, round, and reactive to light.   Cardiovascular:     "  Rate and Rhythm: Normal rate and regular rhythm.      Heart sounds: Normal heart sounds.   Pulmonary:      Effort: Pulmonary effort is normal.      Breath sounds: Normal breath sounds.   Abdominal:      General: Bowel sounds are normal.      Palpations: Abdomen is soft.   Musculoskeletal:         General: Normal range of motion.      Cervical back: Normal range of motion and neck supple.   Skin:     General: Skin is warm and dry.   Neurological:      Mental Status: She is alert and oriented to person, place, and time.   Psychiatric:         Behavior: Behavior normal.         Thought Content: Thought content normal.         Judgment: Judgment normal.         Lab Results:   Lab Results   Component Value Date/Time    Free t4 1.5 08/07/2024 10:31 AM       Portions of the record may have been created with voice recognition software. Occasional wrong word or \"sound a like\" substitutions may have occurred due to the inherent limitations of voice recognition software. Read the chart carefully and recognize, using context, where substitutions have occurred.    "

## 2024-09-27 NOTE — PATIENT INSTRUCTIONS
For now, continue Synthroid at 75 mcg dose Monday through Saturday and no tablet on Sunday.     Continue simvastatin, as discussed.     Complete lab work prior to next visit

## 2024-09-30 DIAGNOSIS — E03.9 HYPOTHYROIDISM, UNSPECIFIED TYPE: Primary | ICD-10-CM

## 2024-10-01 RX ORDER — LEVOTHYROXINE SODIUM 75 UG/1
TABLET ORAL
Qty: 90 TABLET | Refills: 3 | Status: SHIPPED | OUTPATIENT
Start: 2024-10-01 | End: 2024-10-02 | Stop reason: SDUPTHER

## 2024-10-02 DIAGNOSIS — E03.9 HYPOTHYROIDISM, UNSPECIFIED TYPE: ICD-10-CM

## 2024-10-02 RX ORDER — LEVOTHYROXINE SODIUM 75 UG/1
TABLET ORAL
Qty: 90 TABLET | Refills: 3 | Status: SHIPPED | OUTPATIENT
Start: 2024-10-02

## 2024-10-02 NOTE — TELEPHONE ENCOUNTER
iKlo from [facility] calling states that patient's insurance does not cover brand Synthroid, so they need script resent for generic levothyroxine.  He states that patient will still receive brand Synthroid as that is all [facility] dispenses for levothyroxine.  Please resend script for generic.  (Remove brand necessary) Call back # is [phone], Ref # for call is [id number].

## 2024-10-21 DIAGNOSIS — E78.5 HYPERLIPIDEMIA, UNSPECIFIED HYPERLIPIDEMIA TYPE: ICD-10-CM

## 2024-10-21 NOTE — TELEPHONE ENCOUNTER
Reason for call:   [x] Refill   [] Prior Auth  [] Other:     Office:   [] PCP/Provider -   [x] Specialty/Provider - Endo      Medication: simvastatin (ZOCOR) 40 mg tablet Take 1 tablet (40 mg total) by mouth daily at bedtime,       Pharmacy: [facility] - [address]      Does the patient have enough for 3 days?   [x] Yes   [] No - Send as HP to POD

## 2024-10-22 RX ORDER — SIMVASTATIN 40 MG
40 TABLET ORAL
Qty: 90 TABLET | Refills: 1 | Status: SHIPPED | OUTPATIENT
Start: 2024-10-22

## 2024-11-04 ENCOUNTER — HOSPITAL ENCOUNTER (OUTPATIENT)
Dept: HOSPITAL 99 - WDC | Age: 59
End: 2024-11-04
Payer: COMMERCIAL

## 2024-11-04 DIAGNOSIS — Z12.31: Primary | ICD-10-CM

## 2025-06-11 DIAGNOSIS — E78.5 HYPERLIPIDEMIA, UNSPECIFIED HYPERLIPIDEMIA TYPE: ICD-10-CM

## 2025-06-11 NOTE — TELEPHONE ENCOUNTER
Reason for call:   [x] Refill   [] Prior Auth  [] Other:     Office:   [] PCP/Provider -   [x] Specialty/Provider - Clark Harris     Medication: simvastatin (ZOCOR) 40 mg tablet     Dose/Frequency: Take 1 tablet (40 mg total) by mouth daily at bedtime     Quantity: 90    Pharmacy: [facility]    Mail Away Pharmacy   Does the patient have enough for 10 days?   [x] Yes   [] No - Send as HP to POD

## 2025-06-12 RX ORDER — SIMVASTATIN 40 MG
40 TABLET ORAL
Qty: 90 TABLET | Refills: 1 | Status: SHIPPED | OUTPATIENT
Start: 2025-06-12

## 2025-08-07 LAB
CHOLEST SERPL-MCNC: 197 MG/DL
CHOLEST/HDLC SERPL: 2.2 (CALC)
HDLC SERPL-MCNC: 88 MG/DL
LDLC SERPL CALC-MCNC: 92 MG/DL (CALC)
NONHDLC SERPL-MCNC: 109 MG/DL (CALC)
T4 FREE SERPL-MCNC: 1.7 NG/DL (ref 0.8–1.8)
TRIGL SERPL-MCNC: 78 MG/DL
TSH SERPL-ACNC: 0.69 MIU/L (ref 0.4–4.5)